# Patient Record
Sex: MALE | Race: WHITE | NOT HISPANIC OR LATINO | Employment: OTHER | URBAN - METROPOLITAN AREA
[De-identification: names, ages, dates, MRNs, and addresses within clinical notes are randomized per-mention and may not be internally consistent; named-entity substitution may affect disease eponyms.]

---

## 2023-08-21 ENCOUNTER — TELEPHONE (OUTPATIENT)
Age: 53
End: 2023-08-21

## 2023-08-21 ENCOUNTER — PREP FOR PROCEDURE (OUTPATIENT)
Age: 53
End: 2023-08-21

## 2023-08-21 DIAGNOSIS — Z12.11 SCREENING FOR COLON CANCER: Primary | ICD-10-CM

## 2023-08-21 NOTE — TELEPHONE ENCOUNTER
08/21/23  Screened by: Pau Mcfarland    Referring Provider     Pre- Screening: There is no height or weight on file to calculate BMI. 6'1 224 lbs  BMI 29.6  Has patient been referred for a routine screening Colonoscopy? yes  Is the patient between 43-73 years old? yes      Previous Colonoscopy no   If yes:    Date:     Facility:     Reason:       SCHEDULING STAFF: If the patient is between 45yrs-49yrs, please advise patient to confirm benefits/coverage with their insurance company for a routine screening colonoscopy, some insurance carriers will only cover at [address] [place] [address] or older. If the patient is over 66years old, please schedule an office visit. Does the patient want to see a Gastroenterologist prior to their procedure OR are they having any GI symptoms? no    Has the patient been hospitalized or had abdominal surgery in the past 6 months? no    Does the patient use supplemental oxygen? no    Does the patient take Coumadin, Lovenox, Plavix, Elliquis, Xarelto, or other blood thinning medication? no    Has the patient had a stroke, cardiac event, or stent placed in the past year? no     Patient passed OA    SCHEDULING STAFF: If patient answers NO to above questions, then schedule procedure. If patient answers YES to above questions, then schedule office appointment. If patient is between 45yrs - 49yrs, please advise patient that we will have to confirm benefits & coverage with their insurance company for a routine screening colonoscopy.

## 2023-08-21 NOTE — TELEPHONE ENCOUNTER
Scheduled date of colonoscopy (as of today):  9/14/23  Physician performing colonoscopy:   Location of colonoscopy: [facility]   Bowel prep reviewed with patient: Miralax and Dulcolax prep instructions reviewed, emailed and mailed per patients request.  Instructions reviewed with patient by:  Clearances:       Patient has  prime remote unable to find in system. Benefit ID number is [id number] or can use [id number].   Patient had referral from [address] [place] [address] number is [phone]

## 2023-08-28 ENCOUNTER — NURSE TRIAGE (OUTPATIENT)
Age: 53
End: 2023-08-28

## 2023-08-28 ENCOUNTER — TELEPHONE (OUTPATIENT)
Age: 53
End: 2023-08-28

## 2023-08-28 NOTE — TELEPHONE ENCOUNTER
CALL FROM PT INQUIRING IF HE CAN TAKE MEDICATIONS PRIOR TO PROCEDURE, NOT TAKING ANY BLOOD THINNERS, PT ADVISED HE CAN TAKE HIS USUAL PRESCRIBED MEDICATIONS WITH A SIP OF WATER PRIOR TO PROCEDURE.

## 2023-08-28 NOTE — TELEPHONE ENCOUNTER
Sagrario Devoid an email to send Miralax/Ducolax prep instructions to pt at his email on his chart today.

## 2023-09-13 ENCOUNTER — ANESTHESIA EVENT (OUTPATIENT)
Dept: ANESTHESIOLOGY | Facility: HOSPITAL | Age: 53
End: 2023-09-13

## 2023-09-13 ENCOUNTER — ANESTHESIA (OUTPATIENT)
Dept: ANESTHESIOLOGY | Facility: HOSPITAL | Age: 53
End: 2023-09-13

## 2023-09-14 NOTE — ANESTHESIA PREPROCEDURE EVALUATION
Procedure:  PRE-OP ONLY  Colonoscopy CRC Screening     No patient information available in chart       Anesthesia Plan  ASA Score-     Anesthesia Type- IV sedation with anesthesia with ASA Monitors.          Additional Monitors:   Airway Plan:           Plan Factors-    Induction-     Postoperative Plan-     Informed Consent-

## 2023-10-10 ENCOUNTER — HOSPITAL ENCOUNTER (INPATIENT)
Facility: HOSPITAL | Age: 53
LOS: 2 days | Discharge: HOME | DRG: 392 | End: 2023-10-14
Attending: STUDENT IN AN ORGANIZED HEALTH CARE EDUCATION/TRAINING PROGRAM | Admitting: SPECIALIST
Payer: OTHER GOVERNMENT

## 2023-10-10 ENCOUNTER — APPOINTMENT (EMERGENCY)
Dept: ULTRASOUND IMAGING | Facility: HOSPITAL | Age: 53
DRG: 392 | End: 2023-10-10
Payer: OTHER GOVERNMENT

## 2023-10-10 ENCOUNTER — APPOINTMENT (EMERGENCY)
Dept: CT IMAGING | Facility: HOSPITAL | Age: 53
DRG: 392 | End: 2023-10-10
Payer: OTHER GOVERNMENT

## 2023-10-10 DIAGNOSIS — R10.11 RUQ ABDOMINAL PAIN: Primary | ICD-10-CM

## 2023-10-10 DIAGNOSIS — F17.210 TOBACCO SMOKER, 1 PACK OF CIGARETTES OR LESS PER DAY: ICD-10-CM

## 2023-10-10 DIAGNOSIS — K80.50 BILIARY COLIC: ICD-10-CM

## 2023-10-10 DIAGNOSIS — K29.00 ACUTE SUPERFICIAL GASTRITIS WITHOUT HEMORRHAGE: ICD-10-CM

## 2023-10-10 LAB
ALBUMIN SERPL BCP-MCNC: 4.6 G/DL (ref 3.5–5)
ALP SERPL-CCNC: 57 U/L (ref 34–104)
ALT SERPL W P-5'-P-CCNC: 39 U/L (ref 7–52)
ANION GAP SERPL CALCULATED.3IONS-SCNC: 8 MMOL/L
AST SERPL W P-5'-P-CCNC: 18 U/L (ref 13–39)
BASOPHILS # BLD AUTO: 0.07 THOUSANDS/ÂΜL (ref 0–0.1)
BASOPHILS NFR BLD AUTO: 1 % (ref 0–1)
BILIRUB DIRECT SERPL-MCNC: 0.24 MG/DL (ref 0–0.2)
BILIRUB SERPL-MCNC: 1.08 MG/DL (ref 0.2–1)
BUN SERPL-MCNC: 12 MG/DL (ref 5–25)
CALCIUM SERPL-MCNC: 9.4 MG/DL (ref 8.4–10.2)
CHLORIDE SERPL-SCNC: 107 MMOL/L (ref 96–108)
CO2 SERPL-SCNC: 22 MMOL/L (ref 21–32)
CREAT SERPL-MCNC: 0.79 MG/DL (ref 0.6–1.3)
EOSINOPHIL # BLD AUTO: 0.13 THOUSAND/ÂΜL (ref 0–0.61)
EOSINOPHIL NFR BLD AUTO: 1 % (ref 0–6)
ERYTHROCYTE [DISTWIDTH] IN BLOOD BY AUTOMATED COUNT: 13.1 % (ref 11.6–15.1)
GFR SERPL CREATININE-BSD FRML MDRD: 103 ML/MIN/1.73SQ M
GLUCOSE SERPL-MCNC: 153 MG/DL (ref 65–140)
HCT VFR BLD AUTO: 49.6 % (ref 36.5–49.3)
HGB BLD-MCNC: 17.4 G/DL (ref 12–17)
IMM GRANULOCYTES # BLD AUTO: 0.06 THOUSAND/UL (ref 0–0.2)
IMM GRANULOCYTES NFR BLD AUTO: 1 % (ref 0–2)
INR PPP: 1.1 (ref 0.84–1.19)
LACTATE SERPL-SCNC: 1.2 MMOL/L (ref 0.5–2)
LIPASE SERPL-CCNC: 11 U/L (ref 11–82)
LYMPHOCYTES # BLD AUTO: 2.68 THOUSANDS/ÂΜL (ref 0.6–4.47)
LYMPHOCYTES NFR BLD AUTO: 22 % (ref 14–44)
MCH RBC QN AUTO: 30.2 PG (ref 26.8–34.3)
MCHC RBC AUTO-ENTMCNC: 35.1 G/DL (ref 31.4–37.4)
MCV RBC AUTO: 86 FL (ref 82–98)
MONOCYTES # BLD AUTO: 0.92 THOUSAND/ÂΜL (ref 0.17–1.22)
MONOCYTES NFR BLD AUTO: 7 % (ref 4–12)
NEUTROPHILS # BLD AUTO: 8.54 THOUSANDS/ÂΜL (ref 1.85–7.62)
NEUTS SEG NFR BLD AUTO: 68 % (ref 43–75)
NRBC BLD AUTO-RTO: 0 /100 WBCS
PLATELET # BLD AUTO: 242 THOUSANDS/UL (ref 149–390)
PMV BLD AUTO: 10.2 FL (ref 8.9–12.7)
POTASSIUM SERPL-SCNC: 3.7 MMOL/L (ref 3.5–5.3)
PROT SERPL-MCNC: 7.3 G/DL (ref 6.4–8.4)
PROTHROMBIN TIME: 14.5 SECONDS (ref 11.6–14.5)
RBC # BLD AUTO: 5.76 MILLION/UL (ref 3.88–5.62)
SODIUM SERPL-SCNC: 137 MMOL/L (ref 135–147)
WBC # BLD AUTO: 12.4 THOUSAND/UL (ref 4.31–10.16)

## 2023-10-10 PROCEDURE — 80076 HEPATIC FUNCTION PANEL: CPT | Performed by: STUDENT IN AN ORGANIZED HEALTH CARE EDUCATION/TRAINING PROGRAM

## 2023-10-10 PROCEDURE — 74177 CT ABD & PELVIS W/CONTRAST: CPT

## 2023-10-10 PROCEDURE — 83605 ASSAY OF LACTIC ACID: CPT | Performed by: STUDENT IN AN ORGANIZED HEALTH CARE EDUCATION/TRAINING PROGRAM

## 2023-10-10 PROCEDURE — 96366 THER/PROPH/DIAG IV INF ADDON: CPT

## 2023-10-10 PROCEDURE — 80048 BASIC METABOLIC PNL TOTAL CA: CPT | Performed by: STUDENT IN AN ORGANIZED HEALTH CARE EDUCATION/TRAINING PROGRAM

## 2023-10-10 PROCEDURE — 99285 EMERGENCY DEPT VISIT HI MDM: CPT | Performed by: STUDENT IN AN ORGANIZED HEALTH CARE EDUCATION/TRAINING PROGRAM

## 2023-10-10 PROCEDURE — 96375 TX/PRO/DX INJ NEW DRUG ADDON: CPT

## 2023-10-10 PROCEDURE — 99284 EMERGENCY DEPT VISIT MOD MDM: CPT

## 2023-10-10 PROCEDURE — 96365 THER/PROPH/DIAG IV INF INIT: CPT

## 2023-10-10 PROCEDURE — 76705 ECHO EXAM OF ABDOMEN: CPT

## 2023-10-10 PROCEDURE — 85610 PROTHROMBIN TIME: CPT | Performed by: STUDENT IN AN ORGANIZED HEALTH CARE EDUCATION/TRAINING PROGRAM

## 2023-10-10 PROCEDURE — 36415 COLL VENOUS BLD VENIPUNCTURE: CPT | Performed by: STUDENT IN AN ORGANIZED HEALTH CARE EDUCATION/TRAINING PROGRAM

## 2023-10-10 PROCEDURE — G1004 CDSM NDSC: HCPCS

## 2023-10-10 PROCEDURE — 85025 COMPLETE CBC W/AUTO DIFF WBC: CPT | Performed by: STUDENT IN AN ORGANIZED HEALTH CARE EDUCATION/TRAINING PROGRAM

## 2023-10-10 PROCEDURE — 83690 ASSAY OF LIPASE: CPT | Performed by: STUDENT IN AN ORGANIZED HEALTH CARE EDUCATION/TRAINING PROGRAM

## 2023-10-10 RX ORDER — HYDROMORPHONE HCL/PF 1 MG/ML
1 SYRINGE (ML) INJECTION ONCE
Status: DISCONTINUED | OUTPATIENT
Start: 2023-10-10 | End: 2023-10-10

## 2023-10-10 RX ORDER — SODIUM CHLORIDE, SODIUM LACTATE, POTASSIUM CHLORIDE, CALCIUM CHLORIDE 600; 310; 30; 20 MG/100ML; MG/100ML; MG/100ML; MG/100ML
75 INJECTION, SOLUTION INTRAVENOUS CONTINUOUS
Status: DISCONTINUED | OUTPATIENT
Start: 2023-10-10 | End: 2023-10-13

## 2023-10-10 RX ORDER — KETOROLAC TROMETHAMINE 30 MG/ML
15 INJECTION, SOLUTION INTRAMUSCULAR; INTRAVENOUS EVERY 6 HOURS PRN
Status: DISPENSED | OUTPATIENT
Start: 2023-10-10 | End: 2023-10-11

## 2023-10-10 RX ORDER — ONDANSETRON 2 MG/ML
4 INJECTION INTRAMUSCULAR; INTRAVENOUS EVERY 6 HOURS PRN
Status: DISCONTINUED | OUTPATIENT
Start: 2023-10-10 | End: 2023-10-14 | Stop reason: HOSPADM

## 2023-10-10 RX ORDER — SODIUM CHLORIDE 9 MG/ML
100 INJECTION, SOLUTION INTRAVENOUS ONCE
Status: CANCELLED | OUTPATIENT
Start: 2023-10-10 | End: 2023-10-10

## 2023-10-10 RX ORDER — ACETAMINOPHEN 160 MG/5ML
650 SUSPENSION ORAL EVERY 4 HOURS PRN
Status: DISCONTINUED | OUTPATIENT
Start: 2023-10-10 | End: 2023-10-14 | Stop reason: HOSPADM

## 2023-10-10 RX ORDER — KETOROLAC TROMETHAMINE 30 MG/ML
15 INJECTION, SOLUTION INTRAMUSCULAR; INTRAVENOUS ONCE
Status: COMPLETED | OUTPATIENT
Start: 2023-10-10 | End: 2023-10-10

## 2023-10-10 RX ORDER — DIPHENHYDRAMINE HYDROCHLORIDE 50 MG/ML
25 INJECTION INTRAMUSCULAR; INTRAVENOUS EVERY 8 HOURS SCHEDULED
Status: CANCELLED | OUTPATIENT
Start: 2023-10-10 | End: 2023-10-13

## 2023-10-10 RX ORDER — HYDROCHLOROTHIAZIDE 25 MG/1
50 TABLET ORAL DAILY
COMMUNITY

## 2023-10-10 RX ORDER — NICOTINE 21 MG/24HR
1 PATCH, TRANSDERMAL 24 HOURS TRANSDERMAL DAILY
Status: DISCONTINUED | OUTPATIENT
Start: 2023-10-10 | End: 2023-10-14 | Stop reason: HOSPADM

## 2023-10-10 RX ORDER — METOCLOPRAMIDE HYDROCHLORIDE 5 MG/ML
10 INJECTION INTRAMUSCULAR; INTRAVENOUS EVERY 8 HOURS SCHEDULED
Status: CANCELLED | OUTPATIENT
Start: 2023-10-10 | End: 2023-10-13

## 2023-10-10 RX ORDER — MAGNESIUM SULFATE HEPTAHYDRATE 40 MG/ML
2 INJECTION, SOLUTION INTRAVENOUS
Status: CANCELLED | OUTPATIENT
Start: 2023-10-10 | End: 2023-10-13

## 2023-10-10 RX ORDER — KETOROLAC TROMETHAMINE 30 MG/ML
30 INJECTION, SOLUTION INTRAMUSCULAR; INTRAVENOUS EVERY 8 HOURS SCHEDULED
Status: CANCELLED | OUTPATIENT
Start: 2023-10-10 | End: 2023-10-13

## 2023-10-10 RX ORDER — MORPHINE SULFATE 4 MG/ML
4 INJECTION, SOLUTION INTRAMUSCULAR; INTRAVENOUS EVERY 4 HOURS PRN
Status: DISCONTINUED | OUTPATIENT
Start: 2023-10-10 | End: 2023-10-14 | Stop reason: HOSPADM

## 2023-10-10 RX ORDER — SODIUM CHLORIDE, SODIUM GLUCONATE, SODIUM ACETATE, POTASSIUM CHLORIDE, MAGNESIUM CHLORIDE, SODIUM PHOSPHATE, DIBASIC, AND POTASSIUM PHOSPHATE .53; .5; .37; .037; .03; .012; .00082 G/100ML; G/100ML; G/100ML; G/100ML; G/100ML; G/100ML; G/100ML
1000 INJECTION, SOLUTION INTRAVENOUS ONCE
Status: COMPLETED | OUTPATIENT
Start: 2023-10-10 | End: 2023-10-10

## 2023-10-10 RX ORDER — HYDROCHLOROTHIAZIDE 25 MG/1
50 TABLET ORAL DAILY
Status: DISCONTINUED | OUTPATIENT
Start: 2023-10-10 | End: 2023-10-14 | Stop reason: HOSPADM

## 2023-10-10 RX ORDER — BUTALBITAL, ACETAMINOPHEN AND CAFFEINE 50; 325; 40 MG/1; MG/1; MG/1
1 TABLET ORAL EVERY 4 HOURS PRN
Status: CANCELLED | OUTPATIENT
Start: 2023-10-10

## 2023-10-10 RX ORDER — SUMATRIPTAN 6 MG/.5ML
6 INJECTION, SOLUTION SUBCUTANEOUS
Status: CANCELLED | OUTPATIENT
Start: 2023-10-10

## 2023-10-10 RX ADMIN — IOHEXOL 100 ML: 350 INJECTION, SOLUTION INTRAVENOUS at 07:23

## 2023-10-10 RX ADMIN — ACETAMINOPHEN 650 MG: 650 SUSPENSION ORAL at 15:11

## 2023-10-10 RX ADMIN — KETOROLAC TROMETHAMINE 15 MG: 30 INJECTION, SOLUTION INTRAMUSCULAR; INTRAVENOUS at 19:32

## 2023-10-10 RX ADMIN — KETOROLAC TROMETHAMINE 15 MG: 30 INJECTION, SOLUTION INTRAMUSCULAR; INTRAVENOUS at 06:41

## 2023-10-10 RX ADMIN — KETOROLAC TROMETHAMINE 15 MG: 30 INJECTION, SOLUTION INTRAMUSCULAR; INTRAVENOUS at 13:38

## 2023-10-10 RX ADMIN — SODIUM CHLORIDE, SODIUM GLUCONATE, SODIUM ACETATE, POTASSIUM CHLORIDE, MAGNESIUM CHLORIDE, SODIUM PHOSPHATE, DIBASIC, AND POTASSIUM PHOSPHATE 1000 ML: .53; .5; .37; .037; .03; .012; .00082 INJECTION, SOLUTION INTRAVENOUS at 06:34

## 2023-10-10 RX ADMIN — SODIUM CHLORIDE, SODIUM LACTATE, POTASSIUM CHLORIDE, AND CALCIUM CHLORIDE 125 ML/HR: .6; .31; .03; .02 INJECTION, SOLUTION INTRAVENOUS at 11:39

## 2023-10-10 RX ADMIN — SODIUM CHLORIDE, SODIUM LACTATE, POTASSIUM CHLORIDE, AND CALCIUM CHLORIDE 125 ML/HR: .6; .31; .03; .02 INJECTION, SOLUTION INTRAVENOUS at 19:32

## 2023-10-10 RX ADMIN — NICOTINE 1 PATCH: 21 PATCH, EXTENDED RELEASE TRANSDERMAL at 11:38

## 2023-10-10 NOTE — LETTER
10/13/23    Re:  Ashley Stanton,  24    To whom it may concern,    Mr Mary Shrestha is being treated for musculoskeletal pain, and for findings of gastritis on endoscopy. He has been instructed to use warm compresses for his right upper quadrant abdominal wall, to use Celebrex for pain, to avoid other NSAIDs such as ibuprofen, to avoid any heavy lifting or strenuous activity for the next 2 weeks. He has been placed at bedrest for the next 72 hours, and can return to work on 2023 with the above restrictions on strenuous activity or heavy lifting. He has been prescribed PPI medications as well as Carafate, and has follow-up planned with gastroenterology in 2 weeks. Sincerely,        Roxie Simons.  Maddie Medina MD, FACS

## 2023-10-10 NOTE — ED PROVIDER NOTES
History  Chief Complaint   Patient presents with   • Abdominal Pain     Pt feeling ill with flu like sx since Saturday. Pt having abd pain since yesterday. +nausea       History provided by:  Patient and medical records     47year old M. Presents with worsening RUQ abdominal pain. Gnawing in nature. Exacerbated with any movement and PO intake. Worsening since this past Saturday. Having associated nausea without vomiting/diarrhea. Over the last 2 days, he feels as if he has become more constipated. Continues to pass flatus. Having decreased oral intake due to malaise. The patient expresses that he felt as if he had flu-like symptoms due to having subjective fevers/chills, body aches. The patient localized the pain to the RUQ with radiation of the pain into the epigastrium and RLQ. Has been taking Motrin/Tylenol without relief. Took a laxative this AM without relief. History reviewed. No pertinent past medical history. History reviewed. No pertinent surgical history. History reviewed. No pertinent family history. I have reviewed and agree with the history as documented. E-Cigarette/Vaping   • E-Cigarette Use Never User      E-Cigarette/Vaping Substances     Social History     Tobacco Use   • Smoking status: Every Day     Packs/day: 1.50     Types: Cigarettes   • Smokeless tobacco: Never   Vaping Use   • Vaping Use: Never used   Substance Use Topics   • Alcohol use: Not Currently   • Drug use: Never       Review of Systems   Constitutional: Positive for activity change, appetite change, chills, fatigue and fever. HENT: Negative for congestion, rhinorrhea, sinus pressure, sinus pain and sore throat. Respiratory: Negative for cough, chest tightness, shortness of breath and wheezing. Cardiovascular: Negative for chest pain and palpitations. Gastrointestinal: Positive for abdominal pain, constipation and nausea. Negative for abdominal distention, diarrhea and vomiting.    Genitourinary: Negative for decreased urine volume, difficulty urinating, dysuria, flank pain, frequency and urgency. Musculoskeletal: Positive for myalgias. Negative for arthralgias, neck pain and neck stiffness. Skin: Negative for color change, pallor and wound. Neurological: Negative for dizziness, syncope, weakness, light-headedness and headaches. Hematological: Does not bruise/bleed easily. Psychiatric/Behavioral: Positive for sleep disturbance. All other systems reviewed and are negative. Physical Exam  Physical Exam  Vitals and nursing note reviewed. Constitutional:       General: He is in acute distress. Appearance: He is not ill-appearing or toxic-appearing. HENT:      Head: Normocephalic and atraumatic. Cardiovascular:      Rate and Rhythm: Normal rate and regular rhythm. Heart sounds: Normal heart sounds. No murmur heard. Pulmonary:      Effort: Pulmonary effort is normal. No respiratory distress. Breath sounds: Normal breath sounds. No stridor. No wheezing, rhonchi or rales. Chest:      Chest wall: No tenderness. Abdominal:      General: Abdomen is flat. Bowel sounds are normal. There is no distension. Palpations: Abdomen is soft. Tenderness: There is abdominal tenderness in the right upper quadrant, right lower quadrant and epigastric area. There is guarding. There is no rebound. Hernia: No hernia is present. Skin:     General: Skin is warm and dry. Capillary Refill: Capillary refill takes less than 2 seconds. Coloration: Skin is not cyanotic, jaundiced, mottled or pale. Findings: No erythema or rash. Neurological:      General: No focal deficit present. Mental Status: He is alert and oriented to person, place, and time. Cranial Nerves: No cranial nerve deficit. Motor: No weakness.    Psychiatric:         Mood and Affect: Mood normal.         Behavior: Behavior normal.         Vital Signs  ED Triage Vitals [10/10/23 0615]   Temperature Pulse Respirations Blood Pressure SpO2   97.8 °F (36.6 °C) 97 19 (!) 194/101 97 %      Temp Source Heart Rate Source Patient Position - Orthostatic VS BP Location FiO2 (%)   Temporal Monitor Lying Left arm --      Pain Score       --         Vitals:    10/10/23 0615   BP: (!) 194/101   Pulse: 97   Patient Position - Orthostatic VS: Lying     ED Medications  Medications   ondansetron (ZOFRAN) injection 4 mg (has no administration in time range)   morphine injection 4 mg (has no administration in time range)   multi-electrolyte (ISOLYTE-S PH 7.4) bolus 1,000 mL (0 mL Intravenous Stopped 10/10/23 1039)   ketorolac (TORADOL) injection 15 mg (15 mg Intravenous Given 10/10/23 0641)   iohexol (OMNIPAQUE) 350 MG/ML injection (MULTI-DOSE) 100 mL (100 mL Intravenous Given 10/10/23 0723)     Diagnostic Studies  Results Reviewed     Procedure Component Value Units Date/Time    Basic metabolic panel [[id number]]  (Abnormal) Collected: 10/10/23 0633    Lab Status: Final result Specimen: Blood from Arm, Right Updated: 10/10/23 0659     Sodium 137 mmol/L      Potassium 3.7 mmol/L      Chloride 107 mmol/L      CO2 22 mmol/L      ANION GAP 8 mmol/L      BUN 12 mg/dL      Creatinine 0.79 mg/dL      Glucose 153 mg/dL      Calcium 9.4 mg/dL      eGFR 103 ml/min/1.73sq m     Narrative:      [place] guidelines for Chronic Kidney Disease (CKD):   •  Stage 1 with normal or high GFR (GFR > 90 mL/min/1.73 square meters)  •  Stage 2 Mild CKD (GFR = 60-89 mL/min/1.73 square meters)  •  Stage 3A Moderate CKD (GFR = 45-59 mL/min/1.73 square meters)  •  Stage 3B Moderate CKD (GFR = 30-44 mL/min/1.73 square meters)  •  Stage 4 Severe CKD (GFR = 15-29 mL/min/1.73 square meters)  •  Stage 5 End Stage CKD (GFR <15 mL/min/1.73 square meters)  Note: GFR calculation is accurate only with a steady state creatinine    Hepatic function panel [[id number]]  (Abnormal) Collected: 10/10/23 5375    Lab Status: Final result Specimen: Blood from Arm, Right Updated: 10/10/23 0659     Total Bilirubin 1.08 mg/dL      Bilirubin, Direct 0.24 mg/dL      Alkaline Phosphatase 57 U/L      AST 18 U/L      ALT 39 U/L      Total Protein 7.3 g/dL      Albumin 4.6 g/dL     Lipase [[id number]]  (Normal) Collected: 10/10/23 5454    Lab Status: Final result Specimen: Blood from Arm, Right Updated: 10/10/23 0659     Lipase 11 u/L     Lactic acid, plasma (w/reflex if result > 2.0) [[id number]]  (Normal) Collected: 10/10/23 0095    Lab Status: Final result Specimen: Blood from Arm, Right Updated: 10/10/23 5793     LACTIC ACID 1.2 mmol/L     Narrative:      Result may be elevated if tourniquet was used during collection. Protime-INR [[id number]]  (Normal) Collected: 10/10/23 0633    Lab Status: Final result Specimen: Blood from Arm, Right Updated: 10/10/23 0654     Protime 14.5 seconds      INR 1.10    CBC and differential [[id number]]  (Abnormal) Collected: 10/10/23 1779    Lab Status: Final result Specimen: Blood from Arm, Right Updated: 10/10/23 0642     WBC 12.40 Thousand/uL      RBC 5.76 Million/uL      Hemoglobin 17.4 g/dL      Hematocrit 49.6 %      MCV 86 fL      MCH 30.2 pg      MCHC 35.1 g/dL      RDW 13.1 %      MPV 10.2 fL      Platelets 438 Thousands/uL      nRBC 0 /100 WBCs      Neutrophils Relative 68 %      Immat GRANS % 1 %      Lymphocytes Relative 22 %      Monocytes Relative 7 %      Eosinophils Relative 1 %      Basophils Relative 1 %      Neutrophils Absolute 8.54 Thousands/µL      Immature Grans Absolute 0.06 Thousand/uL      Lymphocytes Absolute 2.68 Thousands/µL      Monocytes Absolute 0.92 Thousand/µL      Eosinophils Absolute 0.13 Thousand/µL      Basophils Absolute 0.07 Thousands/µL              US right upper quadrant   Final Result by Ghada Ramon MD (10/10 1020)      Nondistended gallbladder containing a small amount of sludge with minimal pericholecystic fluid, but no wall thickening.  Tenderness throughout the region was elicited, but not specific to the gallbladder. Findings are equivocal for acute cholecystitis. If there is continued clinical concern, HIDA scan can be obtained. Hepatic steatosis. The study was marked in [facility] for immediate notification. Workstation performed: [id number]         CT abdomen pelvis with contrast   Final Result by Stu Rodriguez MD (10/10 0595)      1. Findings suggesting mild enterocolitis in the appropriate clinical setting. 2. Mild infiltration of the peritoneal fat in the right upper quadrant adjacent to the inferior aspect of the liver/gallbladder fundus without calcified cholelithiasis or evidence of acute cholecystitis, possibly reactive to the bowel process, although    slightly remote from it. 3. Hepatic steatosis. Workstation performed: [id number]         NM inpatient order    (Results Pending)          Procedures  Procedures     ED Course  ED Course as of 10/10/23 1117   Tue Oct 10, 2023   5490 Patient declining IV opioid. Will administer a dose of IV Toradol.    0651 Leukocytosis. Mildly elevated hemoglobin/hematocrit likely secondary to decreased oral intake.   0706 Mild hyperbilirubinemia. No significant abnormalities noted on hepatic function panel. Lipase is within normal limits. No significant abnormalities on BMP.   0729 Upon reevaluation, the patient's pain is improved. Continues to have mild lower abdominal cramping. Develops severe right upper quadrant abdominal pain while speaking. 9856 CT abdomen pelvis with contrast   0756 Given the CT read, will obtain right upper quadrant ultrasound. 1022 Ultrasound findings equivocal for acute cholecystitis. Reaching out to general surgery for recommendations (Dr. Cruz Daily)     SBIRT 20yo+    Flowsheet Row Most Recent Value   Initial Alcohol Screen: US AUDIT-C     1. How often do you have a drink containing alcohol? 0 Filed at: 10/10/2023 0619   2.  How many drinks containing alcohol do you have on a typical day you are drinking? 0 Filed at: 10/10/2023 0619   3a. Male UNDER 65: How often do you have five or more drinks on one occasion? 0 Filed at: 10/10/2023 0619   3b. FEMALE Any Age, or MALE 65+: How often do you have 4 or more drinks on one occassion? 0 Filed at: 10/10/2023 4125   Audit-C Score 0 Filed at: 10/10/2023 5460   [place]: How many times in the past year have you. .. Used an illegal drug or used a prescription medication for non-medical reasons? Never Filed at: 10/10/2023 7776        Medical Decision Making  The differential diagnoses include but are not limited to cholecystitis, choledocholithiasis, pancreatitis, cholangitis,  Vital signs reviewed. The laboratory and imaging w/u is equivocal for acute cholecystitis. Pain improved with IV Toradol. Admitted to General Surgery for further management. RUQ abdominal pain: acute illness or injury  Amount and/or Complexity of Data Reviewed  Labs: ordered. Decision-making details documented in ED Course. Radiology: ordered. Decision-making details documented in ED Course. Risk  Prescription drug management. Decision regarding hospitalization. Disposition  Final diagnoses:   RUQ abdominal pain     Time reflects when diagnosis was documented in both MDM as applicable and the Disposition within this note     Time User Action Codes Description Comment    10/10/2023 10:36 AM Rosangela Allison Add [R10.11] RUQ abdominal pain       ED Disposition     ED Disposition   Admit    Condition   Stable    Date/Time   Tue Oct 10, 2023 10:37 AM    Comment   Case was discussed with Dr. Brenda Guidry and the patient's admission status was agreed to be Admission Status: observation status to the service of Dr. Brenda Guidry . Follow-up Information    None         Patient's Medications    No medications on file       No discharge procedures on file.     PDMP Review     None          ED Provider  Electronically Signed by           Ruthy Gonzalez DO  10/10/23 1121

## 2023-10-10 NOTE — H&P
H&P Exam - General Surgery   Bjorn Wright 47 y.o. male MRN: [id number]  Unit/Bed#: -[facility] Encounter: [id number]    Assessment/Plan     Assessment: 15-year-old male with RUQ abdominal pain, rule out acute cholecystitis    US RUQ 10/10/2023: Findings are equivocal for acute cholecystitis. BILIARY:  The gallbladder is normal in caliber. No wall thickening. Minimal pericholecystic fluid. There is layering sludge without evidence for shadowing calculi. Tenderness throughout the region was elicited but not specific to the gallbladder. No intrahepatic biliary dilatation. CBD measures 4.0 mm. No choledocholithiasis. CTAP 10/10/2023:   IMPRESSION:  1. Findings suggesting mild enterocolitis in the appropriate clinical setting. 2. Mild infiltration of the peritoneal fat in the right upper quadrant adjacent to the inferior aspect of the liver/gallbladder fundus without calcified cholelithiasis or evidence of acute cholecystitis, possibly reactive to the bowel process, although   slightly remote from it. 3. Hepatic steatosis.     Afebrile, hypertensive 176/101 otherwise vital signs WNL  WBC 12.4  Hemoglobin 17.4  Lipase, lactic acid normal  Alk phos, transaminases normal  T. bili 1.08    PMHx: HTN, ptsd, HAs  PSHx: Umbilical hernia repair x 2, LIH repair, Left rotator cuff repair     Plan:   CLD  NPO after midnight for HIDA scan tomorrow   IV fluids  Analgesics/antiemetics  Serial abdominal exams  Trend labs    History of Present Illness     HPI:  Ashley Eid is a 47 y.o. male with PMHx of HTN who presents with severe worsening RUQ abdominal pain x 2 days. He has had associated symptoms of nausea, fever, chills, body aches and mild constipation. His pain is a constant ache. It is aggrevated by lying on his side or palpating in the RUQ of his abdomen. He denies diarrhea, hematochezia, bloating, vomiting, cp or sob.  While in the ED, US was equivical for cholecystitis with minimal pericholecystic fluid and no stones or wall thickening. CT showed signs of possible mild enterocolitis. Lab studies showed mild leukocytosis, otherwise normal.     Review of Systems   Constitutional: Negative for chills and fever. HENT: Negative for congestion, ear pain, rhinorrhea and sore throat. Eyes: Negative for pain and visual disturbance. Respiratory: Negative for cough and shortness of breath. Cardiovascular: Negative for chest pain and palpitations. Gastrointestinal: Positive for abdominal pain. Negative for abdominal distention, constipation, diarrhea, nausea and vomiting. Genitourinary: Negative for difficulty urinating, dysuria and hematuria. Musculoskeletal: Negative for arthralgias and back pain. Skin: Negative for color change and rash. Neurological: Positive for headaches. Negative for dizziness, seizures, syncope, facial asymmetry and weakness. Psychiatric/Behavioral: Negative for agitation, behavioral problems and confusion. All other systems reviewed and are negative. Historical Information   History reviewed. No pertinent past medical history. History reviewed. No pertinent surgical history. Social History   Social History     Substance and Sexual Activity   Alcohol Use Not Currently     Social History     Substance and Sexual Activity   Drug Use Never     Social History     Tobacco Use   Smoking Status Every Day   • Packs/day: 1.50   • Types: Cigarettes   Smokeless Tobacco Never     E-Cigarette/Vaping   • E-Cigarette Use Never User      E-Cigarette/Vaping Substances     Family History: non-contributory    Meds/Allergies   PTA meds:   Prior to Admission Medications   Prescriptions Last Dose Informant Patient Reported?  Taking?   hydrochlorothiazide (HYDRODIURIL) 25 mg tablet 10/10/2023  Yes Yes   Sig: Take 50 mg by mouth daily      Facility-Administered Medications: None     No Known Allergies    Objective   First Vitals:   Blood Pressure: (!) 194/101 (10/10/23 0615)  Pulse: 97 (10/10/23 0615)  Temperature: 97.8 °F (36.6 °C) (10/10/23 0615)  Temp Source: Temporal (10/10/23 0615)  Respirations: 19 (10/10/23 0615)  Height: 6' 1" (185.4 cm) (10/10/23 0615)  Weight - Scale: 106 kg (234 lb 5.6 oz) (10/10/23 0615)  SpO2: 97 % (10/10/23 0615)    Current Vitals:   Blood Pressure: (!) 176/101 (10/10/23 1102)  Pulse: 91 (10/10/23 1102)  Temperature: 98.1 °F (36.7 °C) (10/10/23 1102)  Temp Source: Temporal (10/10/23 0615)  Respirations: 16 (10/10/23 1102)  Height: 6' 1" (185.4 cm) (10/10/23 0615)  Weight - Scale: 106 kg (234 lb 5.6 oz) (10/10/23 0615)  SpO2: 96 % (10/10/23 1102)    No intake or output data in the 24 hours ending 10/10/23 1258    Invasive Devices     Peripheral Intravenous Line  Duration           Peripheral IV 10/10/23 Left Antecubital <1 day                Physical Exam  Vitals and nursing note reviewed. Constitutional:       General: He is not in acute distress. Appearance: He is well-developed. HENT:      Head: Normocephalic and atraumatic. Nose: Nose normal.      Mouth/Throat:      Mouth: Mucous membranes are moist.   Eyes:      Conjunctiva/sclera: Conjunctivae normal.   Cardiovascular:      Rate and Rhythm: Normal rate and regular rhythm. Heart sounds: Normal heart sounds. No murmur heard. Pulmonary:      Effort: Pulmonary effort is normal. No respiratory distress. Breath sounds: Normal breath sounds. No wheezing or rales. Abdominal:      General: Abdomen is flat. Bowel sounds are normal. There is no distension. Palpations: Abdomen is soft. There is no mass. Tenderness: There is abdominal tenderness. There is no guarding or rebound. Hernia: No hernia is present. Comments: Tenderness to deep palpation in the RUQ, otherwise abd NT   Musculoskeletal:         General: No swelling. Cervical back: Neck supple. Skin:     General: Skin is warm and dry. Capillary Refill: Capillary refill takes less than 2 seconds.    Neurological: Mental Status: He is alert. Psychiatric:         Mood and Affect: Mood normal.         Lab Results:   CBC:   Lab Results   Component Value Date    WBC 12.40 (H) 10/10/2023    HGB 17.4 (H) 10/10/2023    HCT 49.6 (H) 10/10/2023    MCV 86 10/10/2023     10/10/2023    RBC 5.76 (H) 10/10/2023    MCH 30.2 10/10/2023    MCHC 35.1 10/10/2023    RDW 13.1 10/10/2023    MPV 10.2 10/10/2023    NRBC 0 10/10/2023   , CMP:   Lab Results   Component Value Date    SODIUM 137 10/10/2023    K 3.7 10/10/2023     10/10/2023    CO2 22 10/10/2023    BUN 12 10/10/2023    CREATININE 0.79 10/10/2023    CALCIUM 9.4 10/10/2023    AST 18 10/10/2023    ALT 39 10/10/2023    ALKPHOS 57 10/10/2023    EGFR 103 10/10/2023   , Coagulation:   Lab Results   Component Value Date    INR 1.10 10/10/2023   , Urinalysis: No results found for: "COLORU", "CLARITYU", "Marguerite Large", "PHUR", "LEUKOCYTESUR", "NITRITE", "PROTEINUA", "GLUCOSEU", "Rushie Abler", "Lennette Alamin", "BLOODU", Amylase: No results found for: "AMYLASE", Lipase:   Lab Results   Component Value Date    LIPASE 11 10/10/2023     Imaging: I have personally reviewed pertinent reports. EKG, Pathology, and Other Studies: I have personally reviewed pertinent reports. Code Status: Level 1 - Full Code  Advance Directive and Living Will:      Power of :    POLST:      Counseling / Coordination of Care  Total floor / unit time spent today 45 minutes. Greater than 50% of total time was spent with the patient and / or family counseling and / or coordination of care. A description of the counseling / coordination of care:  Review of patient's  Pmhx history, labs, imaging. Obtaining HPI, performing exam, discussing treatment plan with patient. Sharla Zavala

## 2023-10-11 ENCOUNTER — APPOINTMENT (OUTPATIENT)
Dept: NUCLEAR MEDICINE | Facility: HOSPITAL | Age: 53
DRG: 392 | End: 2023-10-11
Payer: OTHER GOVERNMENT

## 2023-10-11 LAB
ALBUMIN SERPL BCP-MCNC: 4 G/DL (ref 3.5–5)
ALP SERPL-CCNC: 48 U/L (ref 34–104)
ALT SERPL W P-5'-P-CCNC: 31 U/L (ref 7–52)
ANION GAP SERPL CALCULATED.3IONS-SCNC: 7 MMOL/L
AST SERPL W P-5'-P-CCNC: 16 U/L (ref 13–39)
BILIRUB SERPL-MCNC: 1.01 MG/DL (ref 0.2–1)
BUN SERPL-MCNC: 10 MG/DL (ref 5–25)
CALCIUM SERPL-MCNC: 9.1 MG/DL (ref 8.4–10.2)
CHLORIDE SERPL-SCNC: 107 MMOL/L (ref 96–108)
CO2 SERPL-SCNC: 25 MMOL/L (ref 21–32)
CREAT SERPL-MCNC: 0.76 MG/DL (ref 0.6–1.3)
ERYTHROCYTE [DISTWIDTH] IN BLOOD BY AUTOMATED COUNT: 12.9 % (ref 11.6–15.1)
GFR SERPL CREATININE-BSD FRML MDRD: 104 ML/MIN/1.73SQ M
GLUCOSE P FAST SERPL-MCNC: 101 MG/DL (ref 65–99)
GLUCOSE SERPL-MCNC: 101 MG/DL (ref 65–140)
HCT VFR BLD AUTO: 45.7 % (ref 36.5–49.3)
HGB BLD-MCNC: 15.9 G/DL (ref 12–17)
MCH RBC QN AUTO: 29.8 PG (ref 26.8–34.3)
MCHC RBC AUTO-ENTMCNC: 34.8 G/DL (ref 31.4–37.4)
MCV RBC AUTO: 86 FL (ref 82–98)
PLATELET # BLD AUTO: 237 THOUSANDS/UL (ref 149–390)
PMV BLD AUTO: 10.3 FL (ref 8.9–12.7)
POTASSIUM SERPL-SCNC: 3.9 MMOL/L (ref 3.5–5.3)
PROT SERPL-MCNC: 6.4 G/DL (ref 6.4–8.4)
RBC # BLD AUTO: 5.33 MILLION/UL (ref 3.88–5.62)
SODIUM SERPL-SCNC: 139 MMOL/L (ref 135–147)
WBC # BLD AUTO: 10.21 THOUSAND/UL (ref 4.31–10.16)

## 2023-10-11 PROCEDURE — 80053 COMPREHEN METABOLIC PANEL: CPT | Performed by: SPECIALIST

## 2023-10-11 PROCEDURE — 78226 HEPATOBILIARY SYSTEM IMAGING: CPT

## 2023-10-11 PROCEDURE — A9537 TC99M MEBROFENIN: HCPCS

## 2023-10-11 PROCEDURE — G1004 CDSM NDSC: HCPCS

## 2023-10-11 PROCEDURE — 85027 COMPLETE CBC AUTOMATED: CPT | Performed by: SPECIALIST

## 2023-10-11 PROCEDURE — C9113 INJ PANTOPRAZOLE SODIUM, VIA: HCPCS | Performed by: SPECIALIST

## 2023-10-11 RX ORDER — PANTOPRAZOLE SODIUM 40 MG/10ML
40 INJECTION, POWDER, LYOPHILIZED, FOR SOLUTION INTRAVENOUS
Status: DISCONTINUED | OUTPATIENT
Start: 2023-10-11 | End: 2023-10-13

## 2023-10-11 RX ADMIN — KETOROLAC TROMETHAMINE 15 MG: 30 INJECTION, SOLUTION INTRAMUSCULAR; INTRAVENOUS at 10:37

## 2023-10-11 RX ADMIN — HYDROCHLOROTHIAZIDE 50 MG: 25 TABLET ORAL at 08:14

## 2023-10-11 RX ADMIN — SODIUM CHLORIDE, SODIUM LACTATE, POTASSIUM CHLORIDE, AND CALCIUM CHLORIDE 125 ML/HR: .6; .31; .03; .02 INJECTION, SOLUTION INTRAVENOUS at 17:31

## 2023-10-11 RX ADMIN — NICOTINE 1 PATCH: 21 PATCH, EXTENDED RELEASE TRANSDERMAL at 08:14

## 2023-10-11 RX ADMIN — PANTOPRAZOLE SODIUM 40 MG: 40 INJECTION, POWDER, FOR SOLUTION INTRAVENOUS at 17:49

## 2023-10-11 RX ADMIN — SODIUM CHLORIDE, SODIUM LACTATE, POTASSIUM CHLORIDE, AND CALCIUM CHLORIDE 125 ML/HR: .6; .31; .03; .02 INJECTION, SOLUTION INTRAVENOUS at 03:38

## 2023-10-11 NOTE — PLAN OF CARE
Problem: GASTROINTESTINAL - ADULT  Goal: Minimal or absence of nausea and/or vomiting  Description: INTERVENTIONS:  - Administer IV fluids if ordered to ensure adequate hydration  - Maintain NPO status until nausea and vomiting are resolved  - Nasogastric tube if ordered  - Administer ordered antiemetic medications as needed  - Provide nonpharmacologic comfort measures as appropriate  - Advance diet as tolerated, if ordered  - Consider nutrition services referral to assist patient with adequate nutrition and appropriate food choices  Outcome: Progressing     Problem: PAIN - ADULT  Goal: Verbalizes/displays adequate comfort level or baseline comfort level  Description: Interventions:  - Encourage patient to monitor pain and request assistance  - Assess pain using appropriate pain scale  - Administer analgesics based on type and severity of pain and evaluate response  - Implement non-pharmacological measures as appropriate and evaluate response  - Consider cultural and social influences on pain and pain management  - Notify physician/advanced practitioner if interventions unsuccessful or patient reports new pain  Outcome: Progressing     Problem: SAFETY ADULT  Goal: Patient will remain free of falls  Description: INTERVENTIONS:  - Educate patient/family on patient safety including physical limitations  - Instruct patient to call for assistance with activity   - Consult OT/PT to assist with strengthening/mobility   - Keep Call bell within reach  - Keep bed low and locked with side rails adjusted as appropriate  - Keep care items and personal belongings within reach  - Initiate and maintain comfort rounds  - Make Fall Risk Sign visible to staff  - Obtain necessary fall risk management equipment  - Apply yellow socks and bracelet for high fall risk patients  - Consider moving patient to room near nurses station  Outcome: Progressing  Goal: Maintain or return to baseline ADL function  Description: INTERVENTIONS:  - Assess patient's ability to carry out ADLs; assess patient's baseline for ADL function and identify physical deficits which impact ability to perform ADLs (bathing, care of mouth/teeth, toileting, grooming, dressing, etc.)  - Assess/evaluate cause of self-care deficits   - Assess range of motion  - Assess patient's mobility; develop plan if impaired  - Assess patient's need for assistive devices and provide as appropriate  - Encourage maximum independence but intervene and supervise when necessary  - Involve family in performance of ADLs  - Assess for home care needs following discharge   - Consider OT consult to assist with ADL evaluation and planning for discharge  - Provide patient education as appropriate  Outcome: Progressing  Goal: Maintains/Returns to pre admission functional level  Description: INTERVENTIONS:  - Perform BMAT or MOVE assessment daily.   - Set and communicate daily mobility goal to care team and patient/family/caregiver.    - Collaborate with rehabilitation services on mobility goals if consulted  - Ambulate patient 3 times a day  - Out of bed to chair 3 times a day   - Out of bed for meals 3 times a day  - Out of bed for toileting  - Record patient progress and toleration of activity level   Outcome: Progressing     Problem: DISCHARGE PLANNING  Goal: Discharge to home or other facility with appropriate resources  Description: INTERVENTIONS:  - Identify barriers to discharge w/patient and caregiver  - Arrange for needed discharge resources and transportation as appropriate  - Identify discharge learning needs (meds, wound care, etc.)  - Arrange for interpretive services to assist at discharge as needed  - Refer to Case Management Department for coordinating discharge planning if the patient needs post-hospital services based on physician/advanced practitioner order or complex needs related to functional status, cognitive ability, or social support system  Outcome: Progressing     Problem: Knowledge Deficit  Goal: Patient/family/caregiver demonstrates understanding of disease process, treatment plan, medications, and discharge instructions  Description: Complete learning assessment and assess knowledge base.   Interventions:  - Provide teaching at level of understanding  - Provide teaching via preferred learning methods  Outcome: Progressing

## 2023-10-11 NOTE — PROGRESS NOTES
Progress Note - General Surgery   Lisseth Wright 47 y.o. male MRN: [id number]  Unit/Bed#: -01 Encounter: [id number]    Assessment:  47 y.o. male w/ RUQ abdominal pain, rule out acute cholecystitis    - HIDA: Normal hepatobiliary study consistent with patency of the cystic duct and without scintigraphic evidence of acute cholecystitis. - US with findings equivocal for acute cholecystitis   - CT pertinent findings - Mild infiltration of the peritoneal fat in the right upper quadrant adjacent to the inferior aspect of the liver/gallbladder fundus without calcified cholelithiasis or evidence of acute cholecystitis, possibly reactive to the bowel process, although slightly remote from it. - Afebrile, VSS on room air - HTN has improved, now 146/93  - WBC 10.21 (12.40), Hgb 15 (17)  - LFTs WNL  - Tbili 1.01 (1.08)   - Lactic Acid 1.2   - PMHx: HTN, ptsd, HAs  - PSHx: Umbilical hernia repair x 2, LIH repair, Left rotator cuff repair     Plan:  - Patient continues with intermittent severe RUQ abdominal pain, some nausea, without vomiting. HIDA scan results noted and discussed with patient and wife at bedside   - Patient requests if there is any chance of intervention assisting with pain that this be performed during current stay. Explained with normal HIDA scan cholecystectomy is not typically indicated - acknowledging his pain remains severe - briefly discussing risk vs benefit   - Dr. Keegan Du to further discuss with patient and wife once available   - Keep NPO until seen by Dr. Keegan Du  - IVF hydration   - Pain/nausea control PRN  - Serial exams  - SCDs    Subjective:   Patient states he does not feel well this morning. He was able to tolerate some clear liquids yesterday but had severe pain and nausea about an hour after. He denies any vomiting. Patient has been passing gas without bowel movement. He has been urinating without issue. Patient is hopeful for intervention to assist with pain.   He also voices some frustration and is hopeful for discharge soon. Objective:   Blood pressure 146/93, pulse 65, temperature 98.2 °F (36.8 °C), resp. rate 18, height 6' 1" (1.854 m), weight 106 kg (234 lb 5.6 oz), SpO2 95 %. ,Body mass index is 30.92 kg/m². Intake/Output Summary (Last 24 hours) at 10/11/2023 0840  Last data filed at 10/11/2023 0338  Gross per 24 hour   Intake 2477.92 ml   Output --   Net 2477.92 ml     Invasive Devices       Peripheral Intravenous Line  Duration             Peripheral IV 10/10/23 Dorsal (posterior); Left Forearm <1 day                  Physical Exam:   General: no acute distress, pt appears well and comfortable, wife at bedside  Skin: warm and dry to touch  Pulmonary: normal effort  Abdominal: Soft, non-distended, tender to palpation in RUQ otherwise non-tender  Neuro: alert and oriented     Lab, Imaging and other studies:I have personally reviewed pertinent lab results.     CBC:   Lab Results   Component Value Date    WBC 10.21 (H) 10/11/2023    HGB 15.9 10/11/2023    HCT 45.7 10/11/2023    MCV 86 10/11/2023     10/11/2023    RBC 5.33 10/11/2023    MCH 29.8 10/11/2023    MCHC 34.8 10/11/2023    RDW 12.9 10/11/2023    MPV 10.3 10/11/2023   CMP:   Lab Results   Component Value Date    SODIUM 139 10/11/2023    K 3.9 10/11/2023     10/11/2023    CO2 25 10/11/2023    BUN 10 10/11/2023    CREATININE 0.76 10/11/2023    CALCIUM 9.1 10/11/2023    AST 16 10/11/2023    ALT 31 10/11/2023    ALKPHOS 48 10/11/2023    EGFR 104 10/11/2023     VTE Pharmacologic Prophylaxis: Hold for possible OR  VTE Mechanical Prophylaxis: sequential compression device    Brunilda Prasad PA-C  10/11/2023

## 2023-10-12 ENCOUNTER — APPOINTMENT (INPATIENT)
Dept: ULTRASOUND IMAGING | Facility: HOSPITAL | Age: 53
DRG: 392 | End: 2023-10-12
Payer: OTHER GOVERNMENT

## 2023-10-12 LAB
BACTERIA UR QL AUTO: ABNORMAL /HPF
BILIRUB UR QL STRIP: NEGATIVE
CLARITY UR: CLEAR
COLOR UR: YELLOW
ERYTHROCYTE [DISTWIDTH] IN BLOOD BY AUTOMATED COUNT: 12.6 % (ref 11.6–15.1)
GLUCOSE UR STRIP-MCNC: NEGATIVE MG/DL
HCT VFR BLD AUTO: 46.1 % (ref 36.5–49.3)
HGB BLD-MCNC: 16.2 G/DL (ref 12–17)
HGB UR QL STRIP.AUTO: NEGATIVE
KETONES UR STRIP-MCNC: NEGATIVE MG/DL
LEUKOCYTE ESTERASE UR QL STRIP: NEGATIVE
MCH RBC QN AUTO: 30.1 PG (ref 26.8–34.3)
MCHC RBC AUTO-ENTMCNC: 35.1 G/DL (ref 31.4–37.4)
MCV RBC AUTO: 86 FL (ref 82–98)
NITRITE UR QL STRIP: NEGATIVE
NON-SQ EPI CELLS URNS QL MICRO: ABNORMAL /HPF
PH UR STRIP.AUTO: 7 [PH]
PLATELET # BLD AUTO: 269 THOUSANDS/UL (ref 149–390)
PMV BLD AUTO: 10.2 FL (ref 8.9–12.7)
PROCALCITONIN SERPL-MCNC: 0.11 NG/ML
PROT UR STRIP-MCNC: NEGATIVE MG/DL
RBC # BLD AUTO: 5.38 MILLION/UL (ref 3.88–5.62)
RBC #/AREA URNS AUTO: ABNORMAL /HPF
SP GR UR STRIP.AUTO: 1.01 (ref 1–1.03)
UROBILINOGEN UR QL STRIP.AUTO: 4 E.U./DL
WBC # BLD AUTO: 10.06 THOUSAND/UL (ref 4.31–10.16)
WBC #/AREA URNS AUTO: ABNORMAL /HPF

## 2023-10-12 PROCEDURE — 81001 URINALYSIS AUTO W/SCOPE: CPT | Performed by: SPECIALIST

## 2023-10-12 PROCEDURE — 84145 PROCALCITONIN (PCT): CPT | Performed by: SPECIALIST

## 2023-10-12 PROCEDURE — 76705 ECHO EXAM OF ABDOMEN: CPT

## 2023-10-12 PROCEDURE — 85027 COMPLETE CBC AUTOMATED: CPT

## 2023-10-12 PROCEDURE — C9113 INJ PANTOPRAZOLE SODIUM, VIA: HCPCS | Performed by: SPECIALIST

## 2023-10-12 RX ORDER — KETOROLAC TROMETHAMINE 30 MG/ML
30 INJECTION, SOLUTION INTRAMUSCULAR; INTRAVENOUS ONCE
Status: COMPLETED | OUTPATIENT
Start: 2023-10-12 | End: 2023-10-12

## 2023-10-12 RX ORDER — KETOROLAC TROMETHAMINE 30 MG/ML
15 INJECTION, SOLUTION INTRAMUSCULAR; INTRAVENOUS EVERY 6 HOURS PRN
Status: DISCONTINUED | OUTPATIENT
Start: 2023-10-12 | End: 2023-10-14 | Stop reason: HOSPADM

## 2023-10-12 RX ADMIN — SODIUM CHLORIDE, SODIUM LACTATE, POTASSIUM CHLORIDE, AND CALCIUM CHLORIDE 75 ML/HR: .6; .31; .03; .02 INJECTION, SOLUTION INTRAVENOUS at 09:05

## 2023-10-12 RX ADMIN — HYDROCHLOROTHIAZIDE 50 MG: 25 TABLET ORAL at 09:21

## 2023-10-12 RX ADMIN — KETOROLAC TROMETHAMINE 30 MG: 30 INJECTION, SOLUTION INTRAMUSCULAR; INTRAVENOUS at 00:53

## 2023-10-12 RX ADMIN — NICOTINE 1 PATCH: 21 PATCH, EXTENDED RELEASE TRANSDERMAL at 09:27

## 2023-10-12 RX ADMIN — PANTOPRAZOLE SODIUM 40 MG: 40 INJECTION, POWDER, FOR SOLUTION INTRAVENOUS at 09:20

## 2023-10-12 RX ADMIN — SODIUM CHLORIDE, SODIUM LACTATE, POTASSIUM CHLORIDE, AND CALCIUM CHLORIDE 75 ML/HR: .6; .31; .03; .02 INJECTION, SOLUTION INTRAVENOUS at 22:53

## 2023-10-12 RX ADMIN — KETOROLAC TROMETHAMINE 15 MG: 30 INJECTION, SOLUTION INTRAMUSCULAR; INTRAVENOUS at 20:59

## 2023-10-12 NOTE — UTILIZATION REVIEW
Initial Clinical Review  Observation on 10/10 @ 1037 upgraded to Inpatient on 10/12 @ 1523 d/t continued RUQ pain and nausea management. Admission: Date/Time/Statement:   Admission Orders (From admission, onward)       Ordered        10/12/23 1523  Inpatient Admission  Once            10/10/23 1037  Place in Observation  Once                          Orders Placed This Encounter   Procedures    Inpatient Admission     Standing Status:   Standing     Number of Occurrences:   1     Order Specific Question:   Level of Care     Answer:   Med Surg [16]     Order Specific Question:   Estimated length of stay     Answer:   More than 2 Midnights     Order Specific Question:   Certification     Answer:   I certify that inpatient services are medically necessary for this patient for a duration of greater than two midnights. See H&P and MD Progress Notes for additional information about the patient's course of treatment. ED Arrival Information       Expected   -    Arrival   10/10/2023 06:08    Acuity   Urgent              Means of arrival   Walk-In    Escorted by   Self    Service   Surgery-General    Admission type   Emergency              Arrival complaint   abd pain             Chief Complaint   Patient presents with    Abdominal Pain     Pt feeling ill with flu like sx since Saturday. Pt having abd pain since yesterday. +nausea       Initial Presentation: 47 y.o. male w/ PMH of HTN presented to the ED from home w/ severe worsening RUQ abdominal pain x 2 days associated w/ nausea, fever, chills, body aches and constipation. Pt reports pain is constant, aggrevated by lying on his side or palpating in the RUQ of his abdomen. In the ED, /101. WBC 12.40. US was equivical for cholecystitis with minimal pericholecystic fluid and no stones or wall thickening. CT showed signs of possible mild enterocolitis. Lab studies showed mild leukocytosis, otherwise normal. On exam, Tenderness to deep palpation in the RUQ. Given 1L IVF bolus, IV Toradol. Admit as observation level of care for RUQ abdominal pain, rule out acute cholecystitis. Plan: CLD. NPO after midnight for HIDA scan tomorrow. IV fluids  Analgesics/antiemetics. Serial abdominal exams. Trend labs    10/11 Gen Surg Notes: Pt not feeling well this am. Continues to have intermittent severe RUQ pain, nausea. Tolerated clear liqs yesterday but had severe pain and nausea 1 hr after. HIDA: Normal hepatobiliary study consistent with patency of the cystic duct and without scintigraphic evidence of acute cholecystitis. Keep NPO. IVF hydration. Pain/nausea control prn. Serial exams. SCDs. Started on IV protonix. PE: abd soft, nt, bhavna to palpation in RUQ.    10/12 Obs to IP  Gen Surg Notes: Pt continues to have intermittant RUQ severe abd pain. has been passing gas without bowel movement. He has been urinating without issue. NPO, sips with meds. Repeat US. Await GI consult. IVF. Pain and nausea control prn. Serial exams. Cont IV protonix. Date: 10/13   Day 2:   GI Consult: Acute onset RUQ pain. Exam is suggestive of Costochondritis. Cannot r/o PUD:  Plan: sched for EGD. Use heating pads for his RUQ Pain. Further management will be based on the EGD findings. Gen Surg Notes: Pt reports continues to experience pain following eating and intermittently. Had some nausea. NPO since midnight in anticipation of EGD. If no significant findings on EGD will plan to move forward with cholecystectomy. Remains NPO. IVF hydration. Plan for IV abx on call to OR if surgical intervention is needed. Pain/nausea control PRN. Serial exams.  SCDs  PE: abd soft, nd, remains tender to palpation in RUQ     DATE OF SERVICE: 10/13/23  PROCEDURE: EGD   FINDINGS:  Regular Z-line 43 cm from the incisors  Small hiatal hernia  Erythematous mucosa in the fundus of the stomach, body of the stomach and antrum, consistent with gastritis; performed cold forceps biopsy to rule out H. pylori  Erythematous mucosa in the duodenal bulb; performed cold forceps biopsy  The 2nd part of the duodenum appeared normal.  IMPRESSION:  Small hiatal hernia  Abnormal mucosa; performed cold forceps biopsies  The 2nd part of the duodenum appeared normal.     RECOMMENDATION:    Return to the floor for ongoing care  Avoid NSAIDs  Use PPI and Carafate for 2-4 weeks  Advance diet as tolerated         ED Triage Vitals   Temperature Pulse Respirations Blood Pressure SpO2   10/10/23 0615 10/10/23 0615 10/10/23 0615 10/10/23 0615 10/10/23 0615   97.8 °F (36.6 °C) 97 19 (!) 194/101 97 %      Temp Source Heart Rate Source Patient Position - Orthostatic VS BP Location FiO2 (%)   10/10/23 0615 10/10/23 0615 10/10/23 0615 10/10/23 0615 --   Temporal Monitor Lying Left arm       Pain Score       10/10/23 0641       4          Wt Readings from Last 1 Encounters:   10/10/23 106 kg (234 lb 5.6 oz)     Additional Vital Signs:   Date/Time Temp Pulse Resp BP MAP (mmHg) SpO2 O2 Device Patient Position - Orthostatic VS   10/13/23 07:39:59 97.9 °F (36.6 °C) 59 16 158/91 113 94 % -- --   10/12/23 22:52:47 97.9 °F (36.6 °C) 58 18 129/63 85 94 % -- --   10/12/23 2023 -- -- -- -- -- -- None (Room air) --     Date/Time Temp Pulse Resp BP MAP (mmHg) SpO2 O2 Device Patient Position - Orthostatic VS   10/12/23 15:08:25 -- 65 24 Abnormal  163/102 Abnormal  122 97 % -- --   10/12/23 09:22:56 98.6 °F (37 °C) 69 16 150/88 109 94 % -- --   10/12/23 0920 -- -- -- -- -- -- None (Room air) --   10/12/23 06:23:19 98.1 °F (36.7 °C) 60 18 156/94 115 89 % Abnormal  -- --   10/11/23 23:18:12 97.5 °F (36.4 °C) 65 18 130/73 92 93 % None (Room air) Lying   10/11/23 2002 -- -- -- -- -- -- None (Room air) --   10/11/23 15:28:22 98.1 °F (36.7 °C) 80 19 160/105 Abnormal  123 94 % -- --   10/11/23 08:00:17 98.2 °F (36.8 °C) 65 15 146/93 111 95 % None (Room air) Sitting   10/10/23 22:51:24 97.9 °F (36.6 °C) 64 18 138/78 98 94 % -- --   10/10/23 1932 -- -- -- -- -- -- None (Room air) --   10/10/23 15:41:56 98.6 °F (37 °C) 84 17 152/96 115 94 % -- --   10/10/23 14:49:50 -- 84 -- 175/96 Abnormal  122 94 % -- --   10/10/23 11:02:08 98.1 °F (36.7 °C) 91 16 176/101 Abnormal  126 96 % -- --   10/10/23 0700 -- 86 16 151/79 103 94 % None (Room air) --   10/10/23 0636 -- 96 18 162/88 -- 95 % None (Room air) --       Pertinent Labs/Diagnostic Test Results:   US right upper quadrant   Final Result by Karla Dorado MD (10/12 9391)      1. Small amount of gallbladder sludge. No findings of acute cholecystitis. No biliary ductal dilation. 2. Hepatomegaly and marked hepatic steatosis. Workstation performed: [id number]         NM hepatobiliary   Final Result by Silvina Fernández MD (10/11 [phone])      Normal hepatobiliary study consistent with patency of the cystic duct and without scintigraphic evidence of acute cholecystitis. Workstation performed: [id number]         US right upper quadrant   Final Result by Mikki Ni MD (10/10 [phone])      Nondistended gallbladder containing a small amount of sludge with minimal pericholecystic fluid, but no wall thickening. Tenderness throughout the region was elicited, but not specific to the gallbladder. Findings are equivocal for acute cholecystitis. If there is continued clinical concern, HIDA scan can be obtained. Hepatic steatosis. The study was marked in [facility] for immediate notification. Workstation performed: [id number]         CT abdomen pelvis with contrast   Final Result by Mikki Ni MD (10/10 1230)      1. Findings suggesting mild enterocolitis in the appropriate clinical setting. 2. Mild infiltration of the peritoneal fat in the right upper quadrant adjacent to the inferior aspect of the liver/gallbladder fundus without calcified cholelithiasis or evidence of acute cholecystitis, possibly reactive to the bowel process, although    slightly remote from it.       3. Hepatic steatosis.             Workstation performed: [id number]               Results from last 7 days   Lab Units 10/12/23  0616 10/11/23  0532 10/10/23  0633   WBC Thousand/uL 10.06 10.21* 12.40*   HEMOGLOBIN g/dL 16.2 15.9 17.4*   HEMATOCRIT % 46.1 45.7 49.6*   PLATELETS Thousands/uL 269 237 242   NEUTROS ABS Thousands/µL  --   --  8.54*         Results from last 7 days   Lab Units 10/11/23  0532 10/10/23  0633   SODIUM mmol/L 139 137   POTASSIUM mmol/L 3.9 3.7   CHLORIDE mmol/L 107 107   CO2 mmol/L 25 22   ANION GAP mmol/L 7 8   BUN mg/dL 10 12   CREATININE mg/dL 0.76 0.79   EGFR ml/min/1.73sq m 104 103   CALCIUM mg/dL 9.1 9.4     Results from last 7 days   Lab Units 10/11/23  0532 10/10/23  0633   AST U/L 16 18   ALT U/L 31 39   ALK PHOS U/L 48 57   TOTAL PROTEIN g/dL 6.4 7.3   ALBUMIN g/dL 4.0 4.6   TOTAL BILIRUBIN mg/dL 1.01* 1.08*   BILIRUBIN DIRECT mg/dL  --  0.24*         Results from last 7 days   Lab Units 10/11/23  0532 10/10/23  0633   GLUCOSE RANDOM mg/dL 101 153*             Results from last 7 days   Lab Units 10/10/23  0633   PROTIME seconds 14.5   INR  1.10         Results from last 7 days   Lab Units 10/12/23  1056   PROCALCITONIN ng/ml 0.11     Results from last 7 days   Lab Units 10/10/23  1802   LACTIC ACID mmol/L 1.2             Results from last 7 days   Lab Units 10/10/23  0633   LIPASE u/L 11                 Results from last 7 days   Lab Units 10/12/23  1212   CLARITY UA  Clear   COLOR UA  Yellow   SPEC GRAV UA  1.015   PH UA  7.0   GLUCOSE UA mg/dl Negative   KETONES UA mg/dl Negative   BLOOD UA  Negative   PROTEIN UA mg/dl Negative   NITRITE UA  Negative   BILIRUBIN UA  Negative   UROBILINOGEN UA E.U./dl 4.0*   LEUKOCYTES UA  Negative   WBC UA /hpf None Seen   RBC UA /hpf None Seen   BACTERIA UA /hpf None Seen   EPITHELIAL CELLS WET PREP /hpf None Seen               ED Treatment:   Medication Administration from 10/10/2023 0607 to 10/10/2023 1052         Date/Time Order Dose Route Action 10/10/2023 1039 EDT multi-electrolyte (ISOLYTE-S PH 7.4) bolus 1,000 mL 0 mL Intravenous Stopped     10/10/2023 0634 EDT multi-electrolyte (ISOLYTE-S PH 7.4) bolus 1,000 mL 1,000 mL Intravenous New Bag     10/10/2023 0641 EDT ketorolac (TORADOL) injection 15 mg 15 mg Intravenous Given     10/10/2023 0723 EDT iohexol (OMNIPAQUE) 350 MG/ML injection (MULTI-DOSE) 100 mL 100 mL Intravenous Given          Past Medical History:   Diagnosis Date    PTSD (post-traumatic stress disorder)      Present on Admission:  **None**      Admitting Diagnosis: Abdominal pain [R10.9]  RUQ abdominal pain [R10.11]  Age/Sex: 47 y.o. male  Admission Orders:  SCD  I/O  NPO  Scheduled Medications:  hydrochlorothiazide, 50 mg, Oral, Daily  nicotine, 1 patch, Transdermal, Daily  pantoprazole, 40 mg, Intravenous, Q24H [address]      Continuous IV Infusions:  lactated ringers, 75 mL/hr, Intravenous, Continuous      PRN Meds:  acetaminophen, 650 mg, Oral, Q4H PRN 10/10 x 1, 10/13 x 1  ketorolac, 15 mg, Intravenous, Q6H PRN 10/10 x 1, 10/11 x 1, 10/12 x 1, 10/13 x 1  morphine injection, 4 mg, Intravenous, Q4H PRN  ondansetron, 4 mg, Intravenous, Q6H PRN        IP CONSULT TO GASTROENTEROLOGY    Network Utilization Review Department  ATTENTION: Please call with any questions or concerns to [phone] and carefully listen to the prompts so that you are directed to the right person. All voicemails are confidential.   For Discharge needs, contact Care Management DC Support Team at [phone] opt. 2  Send all requests for admission clinical reviews, approved or denied determinations and any other requests to dedicated fax number below belonging to the campus where the patient is receiving treatment.  List of dedicated fax numbers for the Facilities:  [place] DENIALS (Administrative/Medical Necessity) [phone]   DISCHARGE SUPPORT TEAM [phone] [facility] (Maternity/NICU/Pediatrics) [phone] [address] [phone] 150[address] [facility] [address] [address] [facility] [address] [place] [address] [address] [phone]

## 2023-10-12 NOTE — PROGRESS NOTES
Progress Note - General Surgery   Raeann Callejas 47 y.o. male MRN: [id number]  Unit/Bed#: -01 Encounter: [id number]    Assessment:  47 y.o. male w/ RUQ abdominal pain, rule out acute cholecystitis  -Pt continues to have intermittant severe RUQ pain, aggravated by eating.  -Hx of GERD, rule out PUD    - HIDA: Normal hepatobiliary study consistent with patency of the cystic duct and without scintigraphic evidence of acute cholecystitis. - US with findings equivocal for acute cholecystitis -Nondistended gallbladder containing a small amount of sludge with minimal pericholecystic fluid, but no wall thickening.     - CT pertinent findings - No calcified gallstones. No pericholecystic inflammatory change. Mild infiltration of the peritoneal fat in the right upper quadrant adjacent to the inferior aspect of the liver/gallbladder fundus without calcified cholelithiasis or evidence of acute cholecystitis, possibly reactive to the bowel process, although slightly remote from it. - Afebrile, VSS on room air - HTN has improved, now 146/93  - WBC 10 (10.21, 12.40), Hgb 16 (15,17)  - LFTs WNL, Tbili 1.01 yesterday    - PMHx: HTN, ptsd, HAs  - PSHx: Umbilical hernia repair x 2, LIH repair, Left rotator cuff repair     Plan:  - Patient continues with intermittent severe RUQ abdominal pain, some nausea, without vomiting. Patient requests if there is any chance of intervention assisting with pain that this be performed during current stay. -NPO, sips with meds  -Repeat US  - Await GI consult  - IVF hydration   - Pain/nausea control PRN  - Serial exams  - SCDs    Subjective:   Patient states he does not feel well this morning. He continues to have intermittant RUQ severe abd pain. He denies any vomiting. Patient has been passing gas without bowel movement. He has been urinating without issue. Patient is hopeful for intervention to assist with pain.       Objective:   Blood pressure 156/94, pulse 60, temperature 98.1 °F (36.7 °C), temperature source Temporal, resp. rate 18, height 6' 1" (1.854 m), weight 106 kg (234 lb 5.6 oz), SpO2 (!) 89 %. ,Body mass index is 30.92 kg/m². Intake/Output Summary (Last 24 hours) at 10/12/2023 0718  Last data filed at 10/11/2023 1827  Gross per 24 hour   Intake 300 ml   Output --   Net 300 ml     Invasive Devices       Peripheral Intravenous Line  Duration             Peripheral IV 10/10/23 Dorsal (posterior); Left Forearm 1 day                  Physical Exam:   General: no acute distress, pt appears well and comfortable, wife at bedside  Skin: warm and dry to touch  Pulmonary: normal effort  Abdominal: Soft, non-distended, tender to palpation in RUQ otherwise non-tender  Neuro: alert and oriented     Lab, Imaging and other studies:I have personally reviewed pertinent lab results.     CBC:   Lab Results   Component Value Date    WBC 10.06 10/12/2023    HGB 16.2 10/12/2023    HCT 46.1 10/12/2023    MCV 86 10/12/2023     10/12/2023    RBC 5.38 10/12/2023    MCH 30.1 10/12/2023    MCHC 35.1 10/12/2023    RDW 12.6 10/12/2023    MPV 10.2 10/12/2023   CMP:   No results found for: "SODIUM", "K", "CL", "CO2", "ANIONGAP", "BUN", "CREATININE", "GLUCOSE", "CALCIUM", "AST", "ALT", "ALKPHOS", "PROT", "BILITOT", "EGFR"    VTE Pharmacologic Prophylaxis: Hold for possible OR  VTE Mechanical Prophylaxis: sequential compression device    Wei Alvarado PA-C  10/12/2023

## 2023-10-12 NOTE — NURSING NOTE
Patient requesting note be written. Patient states that a sharp stabbing pain, rating >10/10 pain, to the right abdomen woke him out of sleep, this pain decreased to a dull stabbing pain when sitting up but remained requiring pain medication. Patient stated that the pain also took his breath away because it was that intense. Patient is requesting a repeat CT or ultrasound of the abdomen to evaluate for continued inflamed gallbladder.

## 2023-10-12 NOTE — PLAN OF CARE
Problem: GASTROINTESTINAL - ADULT  Goal: Minimal or absence of nausea and/or vomiting  Description: INTERVENTIONS:  - Administer IV fluids if ordered to ensure adequate hydration  - Maintain NPO status until nausea and vomiting are resolved  - Nasogastric tube if ordered  - Administer ordered antiemetic medications as needed  - Provide nonpharmacologic comfort measures as appropriate  - Advance diet as tolerated, if ordered  - Consider nutrition services referral to assist patient with adequate nutrition and appropriate food choices  Outcome: Progressing     Problem: PAIN - ADULT  Goal: Verbalizes/displays adequate comfort level or baseline comfort level  Description: Interventions:  - Encourage patient to monitor pain and request assistance  - Assess pain using appropriate pain scale  - Administer analgesics based on type and severity of pain and evaluate response  - Implement non-pharmacological measures as appropriate and evaluate response  - Consider cultural and social influences on pain and pain management  - Notify physician/advanced practitioner if interventions unsuccessful or patient reports new pain  Outcome: Progressing     Problem: SAFETY ADULT  Goal: Patient will remain free of falls  Description: INTERVENTIONS:  - Educate patient/family on patient safety including physical limitations  - Instruct patient to call for assistance with activity   - Consult OT/PT to assist with strengthening/mobility   - Keep Call bell within reach  - Keep bed low and locked with side rails adjusted as appropriate  - Keep care items and personal belongings within reach  - Initiate and maintain comfort rounds  - Make Fall Risk Sign visible to staff  - Apply yellow socks and bracelet for high fall risk patients  - Consider moving patient to room near nurses station  Outcome: Progressing  Goal: Maintain or return to baseline ADL function  Description: INTERVENTIONS:  -  Assess patient's ability to carry out ADLs; assess patient's baseline for ADL function and identify physical deficits which impact ability to perform ADLs (bathing, care of mouth/teeth, toileting, grooming, dressing, etc.)  - Assess/evaluate cause of self-care deficits   - Assess range of motion  - Assess patient's mobility; develop plan if impaired  - Assess patient's need for assistive devices and provide as appropriate  - Encourage maximum independence but intervene and supervise when necessary  - Involve family in performance of ADLs  - Assess for home care needs following discharge   - Consider OT consult to assist with ADL evaluation and planning for discharge  - Provide patient education as appropriate  Outcome: Progressing  Goal: Maintains/Returns to pre admission functional level  Description: INTERVENTIONS:  - Perform BMAT or MOVE assessment daily.   - Set and communicate daily mobility goal to care team and patient/family/caregiver. - Collaborate with rehabilitation services on mobility goals if consulted  - Perform Range of Motion 3 times a day. - Reposition patient every 3 hours.   - Dangle patient 3 times a day  - Stand patient 3 times a day  - Ambulate patient 3 times a day  - Out of bed to chair 3 times a day   - Out of bed for meals 3 times a day  - Out of bed for toileting  - Record patient progress and toleration of activity level   Outcome: Progressing     Problem: DISCHARGE PLANNING  Goal: Discharge to home or other facility with appropriate resources  Description: INTERVENTIONS:  - Identify barriers to discharge w/patient and caregiver  - Arrange for needed discharge resources and transportation as appropriate  - Identify discharge learning needs (meds, wound care, etc.)  - Arrange for interpretive services to assist at discharge as needed  - Refer to Case Management Department for coordinating discharge planning if the patient needs post-hospital services based on physician/advanced practitioner order or complex needs related to functional status, cognitive ability, or social support system  Outcome: Progressing     Problem: Knowledge Deficit  Goal: Patient/family/caregiver demonstrates understanding of disease process, treatment plan, medications, and discharge instructions  Description: Complete learning assessment and assess knowledge base.   Interventions:  - Provide teaching at level of understanding  - Provide teaching via preferred learning methods  Outcome: Progressing

## 2023-10-12 NOTE — PLAN OF CARE
Problem: GASTROINTESTINAL - ADULT  Goal: Minimal or absence of nausea and/or vomiting  Description: INTERVENTIONS:  - Administer IV fluids if ordered to ensure adequate hydration  - Maintain NPO status until nausea and vomiting are resolved  - Nasogastric tube if ordered  - Administer ordered antiemetic medications as needed  - Provide nonpharmacologic comfort measures as appropriate  - Advance diet as tolerated, if ordered  - Consider nutrition services referral to assist patient with adequate nutrition and appropriate food choices  Outcome: Progressing     Problem: PAIN - ADULT  Goal: Verbalizes/displays adequate comfort level or baseline comfort level  Description: Interventions:  - Encourage patient to monitor pain and request assistance  - Assess pain using appropriate pain scale  - Administer analgesics based on type and severity of pain and evaluate response  - Implement non-pharmacological measures as appropriate and evaluate response  - Consider cultural and social influences on pain and pain management  - Notify physician/advanced practitioner if interventions unsuccessful or patient reports new pain  Outcome: Progressing     Problem: SAFETY ADULT  Goal: Patient will remain free of falls  Description: INTERVENTIONS:  - Educate patient/family on patient safety including physical limitations  - Instruct patient to call for assistance with activity   - Consult OT/PT to assist with strengthening/mobility   - Keep Call bell within reach  - Keep bed low and locked with side rails adjusted as appropriate  - Keep care items and personal belongings within reach  - Initiate and maintain comfort rounds  - Make Fall Risk Sign visible to staff  - Offer Toileting every 1 Hours, in advance of need  - Initiate/Maintain bed/chair alarm  - Obtain necessary fall risk management equipment  - Apply yellow socks and bracelet for high fall risk patients  - Consider moving patient to room near nurses station  Outcome: Progressing  Goal: Maintain or return to baseline ADL function  Description: INTERVENTIONS:  -  Assess patient's ability to carry out ADLs; assess patient's baseline for ADL function and identify physical deficits which impact ability to perform ADLs (bathing, care of mouth/teeth, toileting, grooming, dressing, etc.)  - Assess/evaluate cause of self-care deficits   - Assess range of motion  - Assess patient's mobility; develop plan if impaired  - Assess patient's need for assistive devices and provide as appropriate  - Encourage maximum independence but intervene and supervise when necessary  - Involve family in performance of ADLs  - Assess for home care needs following discharge   - Consider OT consult to assist with ADL evaluation and planning for discharge  - Provide patient education as appropriate  Outcome: Progressing  Goal: Maintains/Returns to pre admission functional level  Description: INTERVENTIONS:  - Perform BMAT or MOVE assessment daily.   - Set and communicate daily mobility goal to care team and patient/family/caregiver. - Collaborate with rehabilitation services on mobility goals if consulted  - Perform Range of Motion 3 times a day. - Reposition patient every 2 hours.   - Dangle patient 3 times a day  - Stand patient 3 times a day  - Ambulate patient 3 times a day  - Out of bed to chair 3 times a day   - Out of bed for meals 3 times a day  - Out of bed for toileting  - Record patient progress and toleration of activity level   Outcome: Progressing     Problem: DISCHARGE PLANNING  Goal: Discharge to home or other facility with appropriate resources  Description: INTERVENTIONS:  - Identify barriers to discharge w/patient and caregiver  - Arrange for needed discharge resources and transportation as appropriate  - Identify discharge learning needs (meds, wound care, etc.)  - Arrange for interpretive services to assist at discharge as needed  - Refer to Case Management Department for coordinating discharge planning if the patient needs post-hospital services based on physician/advanced practitioner order or complex needs related to functional status, cognitive ability, or social support system  Outcome: Progressing     Problem: Knowledge Deficit  Goal: Patient/family/caregiver demonstrates understanding of disease process, treatment plan, medications, and discharge instructions  Description: Complete learning assessment and assess knowledge base.   Interventions:  - Provide teaching at level of understanding  - Provide teaching via preferred learning methods  Outcome: Progressing

## 2023-10-13 ENCOUNTER — ANESTHESIA (OUTPATIENT)
Dept: PERIOP | Facility: HOSPITAL | Age: 53
End: 2023-10-13

## 2023-10-13 ENCOUNTER — ANESTHESIA EVENT (INPATIENT)
Dept: GASTROENTEROLOGY | Facility: HOSPITAL | Age: 53
DRG: 392 | End: 2023-10-13
Payer: OTHER GOVERNMENT

## 2023-10-13 ENCOUNTER — ANESTHESIA EVENT (OUTPATIENT)
Dept: PERIOP | Facility: HOSPITAL | Age: 53
End: 2023-10-13

## 2023-10-13 PROBLEM — K29.00 ACUTE SUPERFICIAL GASTRITIS WITHOUT HEMORRHAGE: Status: ACTIVE | Noted: 2023-10-13

## 2023-10-13 PROBLEM — I10 PRIMARY HYPERTENSION: Status: ACTIVE | Noted: 2023-10-13

## 2023-10-13 PROBLEM — Z87.898 HISTORY OF ANESTHESIA COMPLICATIONS: Status: ACTIVE | Noted: 2023-10-13

## 2023-10-13 PROBLEM — Z87.898 HISTORY OF EMERGENCE DELIRIUM: Status: ACTIVE | Noted: 2023-10-13

## 2023-10-13 PROBLEM — F43.10 PTSD (POST-TRAUMATIC STRESS DISORDER): Status: ACTIVE | Noted: 2023-10-13

## 2023-10-13 PROBLEM — R10.11 RUQ ABDOMINAL PAIN: Status: ACTIVE | Noted: 2023-10-13

## 2023-10-13 PROCEDURE — 0DB68ZX EXCISION OF STOMACH, VIA NATURAL OR ARTIFICIAL OPENING ENDOSCOPIC, DIAGNOSTIC: ICD-10-PCS | Performed by: HOSPITALIST

## 2023-10-13 PROCEDURE — 88305 TISSUE EXAM BY PATHOLOGIST: CPT | Performed by: SPECIALIST

## 2023-10-13 PROCEDURE — C9113 INJ PANTOPRAZOLE SODIUM, VIA: HCPCS | Performed by: SPECIALIST

## 2023-10-13 PROCEDURE — 0DB98ZX EXCISION OF DUODENUM, VIA NATURAL OR ARTIFICIAL OPENING ENDOSCOPIC, DIAGNOSTIC: ICD-10-PCS | Performed by: HOSPITALIST

## 2023-10-13 RX ORDER — KETOROLAC TROMETHAMINE 30 MG/ML
15 INJECTION, SOLUTION INTRAMUSCULAR; INTRAVENOUS ONCE
Status: DISCONTINUED | OUTPATIENT
Start: 2023-10-13 | End: 2023-10-13

## 2023-10-13 RX ORDER — CELECOXIB 100 MG/1
100 CAPSULE ORAL 2 TIMES DAILY
Qty: 60 CAPSULE | Refills: 0 | Status: SHIPPED | OUTPATIENT
Start: 2023-10-13 | End: 2023-11-12

## 2023-10-13 RX ORDER — SUCRALFATE 1 G/1
1 TABLET ORAL
Status: DISCONTINUED | OUTPATIENT
Start: 2023-10-13 | End: 2023-10-14 | Stop reason: HOSPADM

## 2023-10-13 RX ORDER — HALOPERIDOL 5 MG/ML
5 INJECTION INTRAMUSCULAR ONCE
Status: DISCONTINUED | OUTPATIENT
Start: 2023-10-13 | End: 2023-10-14 | Stop reason: HOSPADM

## 2023-10-13 RX ORDER — SUCRALFATE 1 G/1
1 TABLET ORAL
Qty: 56 TABLET | Refills: 0 | Status: SHIPPED | OUTPATIENT
Start: 2023-10-13 | End: 2023-10-27

## 2023-10-13 RX ORDER — PROPOFOL 10 MG/ML
INJECTION, EMULSION INTRAVENOUS AS NEEDED
Status: DISCONTINUED | OUTPATIENT
Start: 2023-10-13 | End: 2023-10-13

## 2023-10-13 RX ORDER — PANTOPRAZOLE SODIUM 40 MG/1
40 TABLET, DELAYED RELEASE ORAL
Qty: 60 TABLET | Refills: 1 | Status: SHIPPED | OUTPATIENT
Start: 2023-10-13 | End: 2023-12-12

## 2023-10-13 RX ORDER — LIDOCAINE HYDROCHLORIDE 20 MG/ML
INJECTION, SOLUTION EPIDURAL; INFILTRATION; INTRACAUDAL; PERINEURAL AS NEEDED
Status: DISCONTINUED | OUTPATIENT
Start: 2023-10-13 | End: 2023-10-13

## 2023-10-13 RX ORDER — NICOTINE 21 MG/24HR
1 PATCH, TRANSDERMAL 24 HOURS TRANSDERMAL DAILY
Qty: 28 PATCH | Refills: 0 | Status: SHIPPED | OUTPATIENT
Start: 2023-10-14

## 2023-10-13 RX ORDER — PANTOPRAZOLE SODIUM 40 MG/1
40 TABLET, DELAYED RELEASE ORAL
Status: DISCONTINUED | OUTPATIENT
Start: 2023-10-13 | End: 2023-10-14 | Stop reason: HOSPADM

## 2023-10-13 RX ORDER — MIDAZOLAM HYDROCHLORIDE 2 MG/2ML
INJECTION, SOLUTION INTRAMUSCULAR; INTRAVENOUS AS NEEDED
Status: DISCONTINUED | OUTPATIENT
Start: 2023-10-13 | End: 2023-10-13

## 2023-10-13 RX ORDER — PANTOPRAZOLE SODIUM 40 MG/1
40 TABLET, DELAYED RELEASE ORAL
Status: DISCONTINUED | OUTPATIENT
Start: 2023-10-14 | End: 2023-10-13

## 2023-10-13 RX ORDER — CELECOXIB 100 MG/1
100 CAPSULE ORAL 2 TIMES DAILY
Status: DISCONTINUED | OUTPATIENT
Start: 2023-10-13 | End: 2023-10-14 | Stop reason: HOSPADM

## 2023-10-13 RX ADMIN — PROPOFOL 50 MG: 10 INJECTION, EMULSION INTRAVENOUS at 14:57

## 2023-10-13 RX ADMIN — SODIUM CHLORIDE 20 MCG: 9 INJECTION, SOLUTION INTRAVENOUS at 14:45

## 2023-10-13 RX ADMIN — KETOROLAC TROMETHAMINE 15 MG: 30 INJECTION, SOLUTION INTRAMUSCULAR; INTRAVENOUS at 10:20

## 2023-10-13 RX ADMIN — HYDROCHLOROTHIAZIDE 50 MG: 25 TABLET ORAL at 08:47

## 2023-10-13 RX ADMIN — SODIUM CHLORIDE 20 MCG: 9 INJECTION, SOLUTION INTRAVENOUS at 14:50

## 2023-10-13 RX ADMIN — ACETAMINOPHEN 650 MG: 650 SUSPENSION ORAL at 05:39

## 2023-10-13 RX ADMIN — NICOTINE 1 PATCH: 21 PATCH, EXTENDED RELEASE TRANSDERMAL at 08:48

## 2023-10-13 RX ADMIN — PROPOFOL 50 MG: 10 INJECTION, EMULSION INTRAVENOUS at 15:01

## 2023-10-13 RX ADMIN — SUCRALFATE 1 G: 1 TABLET ORAL at 22:24

## 2023-10-13 RX ADMIN — CELECOXIB 100 MG: 100 CAPSULE ORAL at 19:58

## 2023-10-13 RX ADMIN — PROPOFOL 50 MG: 10 INJECTION, EMULSION INTRAVENOUS at 14:59

## 2023-10-13 RX ADMIN — PROPOFOL 150 MG: 10 INJECTION, EMULSION INTRAVENOUS at 14:52

## 2023-10-13 RX ADMIN — MIDAZOLAM 2 MG: 1 INJECTION INTRAMUSCULAR; INTRAVENOUS at 14:45

## 2023-10-13 RX ADMIN — PROPOFOL 50 MG: 10 INJECTION, EMULSION INTRAVENOUS at 14:55

## 2023-10-13 RX ADMIN — LIDOCAINE HYDROCHLORIDE 100 MG: 20 INJECTION, SOLUTION EPIDURAL; INFILTRATION; INTRACAUDAL; PERINEURAL at 14:51

## 2023-10-13 RX ADMIN — PANTOPRAZOLE SODIUM 40 MG: 40 INJECTION, POWDER, FOR SOLUTION INTRAVENOUS at 08:47

## 2023-10-13 RX ADMIN — SODIUM CHLORIDE, SODIUM LACTATE, POTASSIUM CHLORIDE, AND CALCIUM CHLORIDE 75 ML/HR: .6; .31; .03; .02 INJECTION, SOLUTION INTRAVENOUS at 12:52

## 2023-10-13 RX ADMIN — PROPOFOL 50 MG: 10 INJECTION, EMULSION INTRAVENOUS at 14:54

## 2023-10-13 RX ADMIN — PROPOFOL 50 MG: 10 INJECTION, EMULSION INTRAVENOUS at 14:53

## 2023-10-13 NOTE — ANESTHESIA POSTPROCEDURE EVALUATION
Post-Op Assessment Note    CV Status:  Stable    Pain management: adequate     Mental Status:  Unresponsive   Hydration Status:  Stable   PONV Controlled:  None   Airway Patency:  Patent   Two or more mitigation strategies used for obstructive sleep apnea   Post Op Vitals Reviewed: Yes      Staff: CRNA         No notable events documented.     /63 (10/13/23 1507)    Temp 97.8 °F (36.6 °C) (10/13/23 1507)    Pulse 83 (10/13/23 1507)   Resp 18 (10/13/23 1507)    SpO2 95 % (10/13/23 1507) right upper chest

## 2023-10-13 NOTE — PLAN OF CARE
Problem: GASTROINTESTINAL - ADULT  Goal: Minimal or absence of nausea and/or vomiting  Description: INTERVENTIONS:  - Administer IV fluids if ordered to ensure adequate hydration  - Maintain NPO status until nausea and vomiting are resolved  - Nasogastric tube if ordered  - Administer ordered antiemetic medications as needed  - Provide nonpharmacologic comfort measures as appropriate  - Advance diet as tolerated, if ordered  - Consider nutrition services referral to assist patient with adequate nutrition and appropriate food choices  Outcome: Progressing     Problem: PAIN - ADULT  Goal: Verbalizes/displays adequate comfort level or baseline comfort level  Description: Interventions:  - Encourage patient to monitor pain and request assistance  - Assess pain using appropriate pain scale  - Administer analgesics based on type and severity of pain and evaluate response  - Implement non-pharmacological measures as appropriate and evaluate response  - Consider cultural and social influences on pain and pain management  - Notify physician/advanced practitioner if interventions unsuccessful or patient reports new pain  Outcome: Progressing     Problem: SAFETY ADULT  Goal: Patient will remain free of falls  Description: INTERVENTIONS:  - Educate patient/family on patient safety including physical limitations  - Instruct patient to call for assistance with activity   - Consult OT/PT to assist with strengthening/mobility   - Keep Call bell within reach  - Keep bed low and locked with side rails adjusted as appropriate  - Keep care items and personal belongings within reach  - Initiate and maintain comfort rounds  - Make Fall Risk Sign visible to staff  - Offer Toileting every 1 Hours, in advance of need  - Apply yellow socks and bracelet for high fall risk patients  - Consider moving patient to room near nurses station  Outcome: Progressing  Goal: Maintain or return to baseline ADL function  Description: INTERVENTIONS:  - Assess patient's ability to carry out ADLs; assess patient's baseline for ADL function and identify physical deficits which impact ability to perform ADLs (bathing, care of mouth/teeth, toileting, grooming, dressing, etc.)  - Assess/evaluate cause of self-care deficits   - Assess range of motion  - Assess patient's mobility; develop plan if impaired  - Assess patient's need for assistive devices and provide as appropriate  - Encourage maximum independence but intervene and supervise when necessary  - Involve family in performance of ADLs  - Assess for home care needs following discharge   - Consider OT consult to assist with ADL evaluation and planning for discharge  - Provide patient education as appropriate  Outcome: Progressing  Goal: Maintains/Returns to pre admission functional level  Description: INTERVENTIONS:  - Perform BMAT or MOVE assessment daily.   - Set and communicate daily mobility goal to care team and patient/family/caregiver. - Collaborate with rehabilitation services on mobility goals if consulted  - Perform Range of Motion 3 times a day. - Reposition patient every 2 hours.   - Dangle patient 3 times a day  - Stand patient 3 times a day  - Ambulate patient 3 times a day  - Out of bed to chair 3 times a day   - Out of bed for meals 3 times a day  - Out of bed for toileting  - Record patient progress and toleration of activity level   Outcome: Progressing     Problem: DISCHARGE PLANNING  Goal: Discharge to home or other facility with appropriate resources  Description: INTERVENTIONS:  - Identify barriers to discharge w/patient and caregiver  - Arrange for needed discharge resources and transportation as appropriate  - Identify discharge learning needs (meds, wound care, etc.)  - Arrange for interpretive services to assist at discharge as needed  - Refer to Case Management Department for coordinating discharge planning if the patient needs post-hospital services based on physician/advanced practitioner order or complex needs related to functional status, cognitive ability, or social support system  Outcome: Progressing     Problem: Knowledge Deficit  Goal: Patient/family/caregiver demonstrates understanding of disease process, treatment plan, medications, and discharge instructions  Description: Complete learning assessment and assess knowledge base.   Interventions:  - Provide teaching at level of understanding  - Provide teaching via preferred learning methods  Outcome: Progressing

## 2023-10-13 NOTE — CONSULTS
Consultation - GI   Tyson Kusum Wright 47 y.o. male MRN: [id number]  Unit/Bed#: -01 Encounter: [id number]      Assessment/Plan     1. Acute onset RUQ pain. Work up is negative for Pancreatobiliary pathologies  Exam is suggestive of Costochondritis  Cannot r/o PUD      Will schedule for EGD  Use heating pads for his RUQ Pain. Further management will be based on the EGD findings. Consent: We have discussed the procedure in detail. We reviewed risks, benefits and alternative as well as protentional complications including and not limited to medication side effect , infection, bleeding, perforation and the potential need for surgery, ICU admission, CPR, as well as the need for blood product transfusion. Patient verbalized understanding and agreement. All patient questions were answered. Plan of care was discussed with the patient and primary team        History of Present Illness   Physician Requesting Consult: Rayna Canavan, MD  Reason for Consult / Principal Problem: abdominal pain  Hx and PE limited by:   HPI: Khalif Garcia is a 47y.o. year old male who was admitted to the hospital for abdominal pain evaluation. He reported experiencing building up pain that started on Saturday he described the pain as sharp like a knife is mostly on the right upper quadrant with radiation to the epigastric and back area can wrap around his chest sometimes. His pain improved with bowel rest and sitting and did get exacerbated with movement an eating. He reported associated nausea without significant change in his bowel habit. No vomiting. He denies any fever or chills. In the emergency room, he was hemodynamically stable. His blood pressure was elevated which he attributed to his pain. Labs were unremarkable. Abdominal imaging including CT scan of the abdominal pelvis with contrast showed some changes consistent or concerning for gallbladder disease versus enterocolitis.   This was followed by HIDA scan and ultrasound which all came back negative for acute cholecystitis. GI consult was requested for further evaluation&recommendations. During my encounter, patient was sitting in bed in mild distress. He was given his last pain medication around 2 AM.  He told me that he is afraid to eat as this might bring his pain to more than 10 out of 10. He reports taking nonsteroidal on as-needed basis specifically ibuprofen. And he also reported history of retching and coughing. He had no prior endoscopic work up. He reported Gi discomfort with diary product over the last year. He drinks filtered Well water. No similar symptoms in the house members. Consults    Review of Systems   Constitutional: Negative. Historical Information   Past Medical History:   Diagnosis Date    PTSD (post-traumatic stress disorder)      History reviewed. No pertinent surgical history. Social History   Social History     Substance and Sexual Activity   Alcohol Use Not Currently     Social History     Substance and Sexual Activity   Drug Use Never     E-Cigarette/Vaping    E-Cigarette Use Never User      E-Cigarette/Vaping Substances     Social History     Tobacco Use   Smoking Status Every Day    Packs/day: 1.50    Types: Cigarettes   Smokeless Tobacco Never     Family History: non-contributory    Meds/Allergies   all current active meds have been reviewed    No Known Allergies    Objective       Intake/Output Summary (Last 24 hours) at 10/13/2023 0811  Last data filed at 10/12/2023 2253  Gross per 24 hour   Intake 3952.92 ml   Output --   Net 3952.92 ml       Invasive Devices:   Peripheral IV 10/10/23 Dorsal (posterior); Left Forearm (Active)   Site Assessment WDL 10/12/23 2023   Dressing Type Transparent 10/12/23 2023   Line Status Infusing 10/12/23 2023   Dressing Status Clean;Dry; Intact 10/12/23 2023   Dressing Change Due 10/14/23 10/12/23 0920   Reason Not Rotated Not due 10/12/23 0920       Physical Exam  Vitals and nursing note reviewed. Constitutional:       General: He is not in acute distress. Appearance: He is well-developed. HENT:      Head: Normocephalic and atraumatic. Eyes:      Conjunctiva/sclera: Conjunctivae normal.   Cardiovascular:      Rate and Rhythm: Normal rate and regular rhythm. Heart sounds: No murmur heard. Pulmonary:      Effort: Pulmonary effort is normal. No respiratory distress. Breath sounds: Normal breath sounds. Abdominal:      Palpations: Abdomen is soft. Tenderness: There is abdominal tenderness. There is no right CVA tenderness, left CVA tenderness or guarding. Comments: Pain is reproducible and more on the right ribs   Musculoskeletal:         General: No swelling. Cervical back: Neck supple. Skin:     General: Skin is warm and dry. Capillary Refill: Capillary refill takes less than 2 seconds. Neurological:      Mental Status: He is alert. Psychiatric:         Mood and Affect: Mood normal.         Lab Results: I have personally reviewed pertinent reports. Imaging Studies: I have personally reviewed pertinent films in PACS        Counseling / Coordination of Care  Total floor / unit time spent today 40 minutes. Greater than 50% of total time was spent with the patient and / or family counseling and / or coordination of care.  A description of the counseling / coordination of care:
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
## 2023-10-13 NOTE — DISCHARGE INSTR - AVS FIRST PAGE
Bedrest for next 72 hrs    Return to work Tuesday October 17th. Moist Heat to Tender abdominal wall (Hot Water bottle wrapped in Towel, or Heating Pad wrapped in a towel) 20 minutes several times a day. No Heavy lifting, or strenuous exercise for next 2 weeks    No Ibuprofen, use Celebrex as needed for pain    Take Dexilant or protonix twice a day    Take Carafate 4 x a day    Avoid Caffeine, Avoid tobacco.    Diet as tolerated.

## 2023-10-13 NOTE — PROGRESS NOTES
Progress Note - General Surgery   Lisseth Wright 47 y.o. male MRN: [id number]  Unit/Bed#: -01 Encounter: [id number]    Assessment:  47 y.o. male w/ RUQ abdominal pain aggravated by eating  - HIDA: Normal hepatobiliary study consistent with patency of the cystic duct and without scintigraphic evidence of acute cholecystitis. - US with findings equivocal for acute cholecystitis - Nondistended gallbladder containing a small amount of sludge with minimal pericholecystic fluid, but no wall thickening.      - CT pertinent findings - No calcified gallstones. No pericholecystic inflammatory change. Mild infiltration of the peritoneal fat in the right upper quadrant adjacent to the inferior aspect of the liver/gallbladder fundus without calcified cholelithiasis or evidence of acute cholecystitis, possibly reactive to the bowel process, although slightly remote from it. - Afebrile, VSS on room air - HTN - 158/91  - WBC 10.06 (10.21, 12.40), Hgb 16 (15,17)  - LFTs WNL, Tbili 1.01 performed 10/11     - PMHx: HTN, ptsd, HAs  - PSHx: Umbilical hernia repair x 2, LIH repair, Left rotator cuff repair      Plan:  - Patient continues with intermittent severe RUQ abdominal pain, GI was consulted and will be performing EGD today to r/o PUD  - If no significant findings on EGD will plan to move forward with cholecystectomy which has been discussed with patient and wife by Dr. Kaley Gore  - IVF hydration   - Plan for IV abx on call to OR if surgical intervention is needed  - Pain/nausea control PRN  - Serial exams  - SCDs    Subjective:   Patient states he continues to experience pain following eating and intermittently. He has had some nausea without vomiting. Patient has been NPO since midnight in anticipation of EGD. No fevers, shortness of breath, or chest tightness. Objective:  Blood pressure 158/91, pulse 59, temperature 97.9 °F (36.6 °C), resp.  rate 16, height 6' 1" (1.854 m), weight 106 kg (234 lb 5.6 oz), SpO2 94 %. ,Body mass index is 30.92 kg/m². Intake/Output Summary (Last 24 hours) at 10/13/2023 1149  Last data filed at 10/12/2023 2253  Gross per 24 hour   Intake 3952.92 ml   Output --   Net 3952.92 ml     Invasive Devices       Peripheral Intravenous Line  Duration             Peripheral IV 10/10/23 Dorsal (posterior); Left Forearm 2 days                  Physical Exam:   General: no acute distress, pt appears well and comfortable  Skin: warm and dry to touch  Pulmonary: normal effort  Abdominal: soft, non-distended, remains tender to palpation in RUQ without guarding or rebound noted at this time   Neuro: alert and oriented     Lab, Imaging and other studies:I have personally reviewed pertinent lab results.   , CBC: No results found for: "WBC", "HGB", "HCT", "MCV", "PLT", "ADJUSTEDWBC", "RBC", "MCH", "MCHC", "RDW", "MPV", "NRBC", CMP: No results found for: "SODIUM", "K", "CL", "CO2", "ANIONGAP", "BUN", "CREATININE", "GLUCOSE", "CALCIUM", "AST", "ALT", "ALKPHOS", "PROT", "BILITOT", "EGFR"  VTE Pharmacologic Prophylaxis: Hold for possible OR  VTE Mechanical Prophylaxis: sequential compression device    Brunilda Mcrae PA-C  10/13/2023

## 2023-10-13 NOTE — ANESTHESIA POSTPROCEDURE EVALUATION
Post-Op Assessment Note    CV Status:  Stable  Pain Score: 0       Mental Status:  Agitated   Hydration Status:  Stable   PONV Controlled:  None   Airway Patency:  Patent       Staff: Anesthesiologist       Patient experienced severe emergence delirium and agitation in the PACU after emerging from anesthesia. He is a combat  with a history of PTSD. Frequent reassurance and redirection provided by wife and nurse at bedside. Security and multiple staff members at bedside for approximately an hour as he recovered from his anesthetic.

## 2023-10-14 VITALS
TEMPERATURE: 98.2 F | OXYGEN SATURATION: 94 % | HEIGHT: 73 IN | SYSTOLIC BLOOD PRESSURE: 156 MMHG | RESPIRATION RATE: 18 BRPM | BODY MASS INDEX: 29.69 KG/M2 | WEIGHT: 224 LBS | DIASTOLIC BLOOD PRESSURE: 86 MMHG | HEART RATE: 61 BPM

## 2023-10-14 LAB
ALBUMIN SERPL BCP-MCNC: 4.3 G/DL (ref 3.5–5)
ALP SERPL-CCNC: 55 U/L (ref 34–104)
ALT SERPL W P-5'-P-CCNC: 57 U/L (ref 7–52)
ANION GAP SERPL CALCULATED.3IONS-SCNC: 8 MMOL/L
AST SERPL W P-5'-P-CCNC: 36 U/L (ref 13–39)
BASOPHILS # BLD AUTO: 0.04 THOUSANDS/ÂΜL (ref 0–0.1)
BASOPHILS NFR BLD AUTO: 1 % (ref 0–1)
BILIRUB SERPL-MCNC: 0.54 MG/DL (ref 0.2–1)
BUN SERPL-MCNC: 16 MG/DL (ref 5–25)
CALCIUM SERPL-MCNC: 9.4 MG/DL (ref 8.4–10.2)
CHLORIDE SERPL-SCNC: 105 MMOL/L (ref 96–108)
CO2 SERPL-SCNC: 24 MMOL/L (ref 21–32)
CREAT SERPL-MCNC: 0.95 MG/DL (ref 0.6–1.3)
EOSINOPHIL # BLD AUTO: 0.18 THOUSAND/ÂΜL (ref 0–0.61)
EOSINOPHIL NFR BLD AUTO: 2 % (ref 0–6)
ERYTHROCYTE [DISTWIDTH] IN BLOOD BY AUTOMATED COUNT: 12.4 % (ref 11.6–15.1)
GFR SERPL CREATININE-BSD FRML MDRD: 91 ML/MIN/1.73SQ M
GLUCOSE SERPL-MCNC: 96 MG/DL (ref 65–140)
HCT VFR BLD AUTO: 44 % (ref 36.5–49.3)
HGB BLD-MCNC: 15.4 G/DL (ref 12–17)
IMM GRANULOCYTES # BLD AUTO: 0.04 THOUSAND/UL (ref 0–0.2)
IMM GRANULOCYTES NFR BLD AUTO: 1 % (ref 0–2)
LYMPHOCYTES # BLD AUTO: 0.97 THOUSANDS/ÂΜL (ref 0.6–4.47)
LYMPHOCYTES NFR BLD AUTO: 13 % (ref 14–44)
MCH RBC QN AUTO: 29.8 PG (ref 26.8–34.3)
MCHC RBC AUTO-ENTMCNC: 35 G/DL (ref 31.4–37.4)
MCV RBC AUTO: 85 FL (ref 82–98)
MONOCYTES # BLD AUTO: 0.58 THOUSAND/ÂΜL (ref 0.17–1.22)
MONOCYTES NFR BLD AUTO: 8 % (ref 4–12)
NEUTROPHILS # BLD AUTO: 5.73 THOUSANDS/ÂΜL (ref 1.85–7.62)
NEUTS SEG NFR BLD AUTO: 75 % (ref 43–75)
NRBC BLD AUTO-RTO: 0 /100 WBCS
PLATELET # BLD AUTO: 258 THOUSANDS/UL (ref 149–390)
PMV BLD AUTO: 10.2 FL (ref 8.9–12.7)
POTASSIUM SERPL-SCNC: 3.9 MMOL/L (ref 3.5–5.3)
PROT SERPL-MCNC: 6.6 G/DL (ref 6.4–8.4)
RBC # BLD AUTO: 5.16 MILLION/UL (ref 3.88–5.62)
SODIUM SERPL-SCNC: 137 MMOL/L (ref 135–147)
WBC # BLD AUTO: 7.54 THOUSAND/UL (ref 4.31–10.16)

## 2023-10-14 PROCEDURE — 85025 COMPLETE CBC W/AUTO DIFF WBC: CPT | Performed by: PHYSICIAN ASSISTANT

## 2023-10-14 PROCEDURE — 80053 COMPREHEN METABOLIC PANEL: CPT | Performed by: PHYSICIAN ASSISTANT

## 2023-10-14 RX ADMIN — PANTOPRAZOLE SODIUM 40 MG: 40 TABLET, DELAYED RELEASE ORAL at 05:54

## 2023-10-14 RX ADMIN — CELECOXIB 100 MG: 100 CAPSULE ORAL at 08:51

## 2023-10-14 RX ADMIN — SUCRALFATE 1 G: 1 TABLET ORAL at 11:50

## 2023-10-14 RX ADMIN — SUCRALFATE 1 G: 1 TABLET ORAL at 05:54

## 2023-10-14 RX ADMIN — ACETAMINOPHEN 650 MG: 650 SUSPENSION ORAL at 09:18

## 2023-10-14 RX ADMIN — HYDROCHLOROTHIAZIDE 50 MG: 25 TABLET ORAL at 08:51

## 2023-10-14 RX ADMIN — NICOTINE 1 PATCH: 21 PATCH, EXTENDED RELEASE TRANSDERMAL at 08:51

## 2023-10-14 NOTE — PLAN OF CARE
Problem: PAIN - ADULT  Goal: Verbalizes/displays adequate comfort level or baseline comfort level  Description: Interventions:  - Encourage patient to monitor pain and request assistance  - Assess pain using appropriate pain scale  - Administer analgesics based on type and severity of pain and evaluate response  - Implement non-pharmacological measures as appropriate and evaluate response  - Consider cultural and social influences on pain and pain management  - Notify physician/advanced practitioner if interventions unsuccessful or patient reports new pain  Outcome: Progressing     Problem: SAFETY ADULT  Goal: Patient will remain free of falls  Description: INTERVENTIONS:  - Educate patient/family on patient safety including physical limitations  - Instruct patient to call for assistance with activity   - Consult OT/PT to assist with strengthening/mobility   - Keep Call bell within reach  - Keep bed low and locked with side rails adjusted as appropriate  - Keep care items and personal belongings within reach  - Initiate and maintain comfort rounds  - Make Fall Risk Sign visible to staff

## 2023-10-14 NOTE — PROGRESS NOTES
Progress Note - General Surgery   Manjeet Perry 47 y.o. male MRN: [id number]  Unit/Bed#: -01 Encounter: [id number]    Assessment:  48 yo M with RUQ pain, likely 2/2 gastritis  HIDA negative    EGD 10/13 with erythematous mucosa in stomach consistent with gastritis    Plan:  - stable for d/c today with PPI, carafate  - f/u with GI as outpt  - f/u with general surgery in 3-4 weeks to reevaluate sx and discuss need for cholecystectomy electively  - gastritis diet, details provided    Subjective/Objective     Subjective: no acute complaints or events overnight. Myles Artis for d/c    Objective:     Blood pressure 156/86, pulse 61, temperature 98.2 °F (36.8 °C), resp. rate 18, height 6' 1" (1.854 m), weight 102 kg (224 lb), SpO2 94 %. ,Body mass index is 29.55 kg/m². Intake/Output Summary (Last 24 hours) at 10/14/2023 1216  Last data filed at 10/13/2023 1502  Gross per 24 hour   Intake 200 ml   Output --   Net 200 ml       Invasive Devices       None                   Physical Exam  Vitals and nursing note reviewed. Constitutional:       General: He is not in acute distress. Appearance: Normal appearance. He is normal weight. He is not ill-appearing, toxic-appearing or diaphoretic. HENT:      Head: Normocephalic and atraumatic. Eyes:      Extraocular Movements: Extraocular movements intact. Cardiovascular:      Rate and Rhythm: Normal rate. Pulmonary:      Effort: Pulmonary effort is normal. No respiratory distress. Abdominal:      General: There is no distension. Palpations: Abdomen is soft. Tenderness: There is no abdominal tenderness. There is no guarding or rebound. Musculoskeletal:         General: No swelling or tenderness. Skin:     General: Skin is warm. Capillary Refill: Capillary refill takes less than 2 seconds. Neurological:      General: No focal deficit present. Mental Status: He is alert and oriented to person, place, and time.    Psychiatric:         Mood and Affect: Mood normal.         Behavior: Behavior normal.            Scheduled Meds:  Current Facility-Administered Medications   Medication Dose Route Frequency Provider Last Rate    acetaminophen  650 mg Oral Q4H PRN Juliann Solano PA-C      celecoxib  100 mg Oral BID Bonita Mercer MD      hydrochlorothiazide  50 mg Oral Daily Juliann Solano PA-C      ketorolac  15 mg Intravenous Q6H PRN Chetan Anton MD      morphine injection  4 mg Intravenous Q4H PRN Bonita Mercer MD      nicotine  1 patch Transdermal Daily Bonita Mercer MD      ondansetron  4 mg Intravenous Q6H PRN Bonita Mercer MD      pantoprazole  40 mg Oral BID AC Bonita Mercer MD      sucralfate  1 g Oral 4x Daily [facility] & ) Bonita Mercer MD       Continuous Infusions:   PRN Meds:.  acetaminophen    ketorolac    morphine injection    ondansetron      Lab, Imaging and other studies:I have personally reviewed pertinent lab results.   , CBC:   Lab Results   Component Value Date    WBC 7.54 10/14/2023    HGB 15.4 10/14/2023    HCT 44.0 10/14/2023    MCV 85 10/14/2023     10/14/2023    RBC 5.16 10/14/2023    MCH 29.8 10/14/2023    MCHC 35.0 10/14/2023    RDW 12.4 10/14/2023    MPV 10.2 10/14/2023    NRBC 0 10/14/2023   , CMP:   Lab Results   Component Value Date    SODIUM 137 10/14/2023    K 3.9 10/14/2023     10/14/2023    CO2 24 10/14/2023    BUN 16 10/14/2023    CREATININE 0.95 10/14/2023    CALCIUM 9.4 10/14/2023    AST 36 10/14/2023    ALT 57 (H) 10/14/2023    ALKPHOS 55 10/14/2023    EGFR 91 10/14/2023   , Coagulation: No results found for: "PT", "INR", "APTT", Urinalysis: No results found for: "COLORU", "CLARITYU", "SPECGRAV", "PHUR", "LEUKOCYTESUR", "NITRITE", "PROTEINUA", "GLUCOSEU", "Aldair Lolling", "BILIRUBINUR", "BLOODU", Lipase: No results found for: "LIPASE"    VTE Mechanical Prophylaxis: sequential compression device      Francesca Godwin PA-C  10/14/2023 12:16 PM

## 2023-10-14 NOTE — PLAN OF CARE
Problem: GASTROINTESTINAL - ADULT  Goal: Minimal or absence of nausea and/or vomiting  Description: INTERVENTIONS:  - Administer IV fluids if ordered to ensure adequate hydration  - Maintain NPO status until nausea and vomiting are resolved  - Nasogastric tube if ordered  - Administer ordered antiemetic medications as needed  - Provide nonpharmacologic comfort measures as appropriate  - Advance diet as tolerated, if ordered  - Consider nutrition services referral to assist patient with adequate nutrition and appropriate food choices  Outcome: Adequate for Discharge     Problem: PAIN - ADULT  Goal: Verbalizes/displays adequate comfort level or baseline comfort level  Description: Interventions:  - Encourage patient to monitor pain and request assistance  - Assess pain using appropriate pain scale  - Administer analgesics based on type and severity of pain and evaluate response  - Implement non-pharmacological measures as appropriate and evaluate response  - Consider cultural and social influences on pain and pain management  - Notify physician/advanced practitioner if interventions unsuccessful or patient reports new pain  Outcome: Adequate for Discharge     Problem: SAFETY ADULT  Goal: Patient will remain free of falls  Description: INTERVENTIONS:  - Educate patient/family on patient safety including physical limitations  - Instruct patient to call for assistance with activity   - Consult OT/PT to assist with strengthening/mobility   - Keep Call bell within reach  - Keep bed low and locked with side rails adjusted as appropriate  - Keep care items and personal belongings within reach  - Initiate and maintain comfort rounds  - Make Fall Risk Sign visible to staff  - Offer Toileting every    Hours, in advance of need  - Initiate/Maintain   alarm  - Obtain necessary fall risk management equipment:     - Apply yellow socks and bracelet for high fall risk patients  - Consider moving patient to room near nurses station  Outcome: Adequate for Discharge  Goal: Maintain or return to baseline ADL function  Description: INTERVENTIONS:  -  Assess patient's ability to carry out ADLs; assess patient's baseline for ADL function and identify physical deficits which impact ability to perform ADLs (bathing, care of mouth/teeth, toileting, grooming, dressing, etc.)  - Assess/evaluate cause of self-care deficits   - Assess range of motion  - Assess patient's mobility; develop plan if impaired  - Assess patient's need for assistive devices and provide as appropriate  - Encourage maximum independence but intervene and supervise when necessary  - Involve family in performance of ADLs  - Assess for home care needs following discharge   - Consider OT consult to assist with ADL evaluation and planning for discharge  - Provide patient education as appropriate  Outcome: Adequate for Discharge  Goal: Maintains/Returns to pre admission functional level  Description: INTERVENTIONS:  - Perform BMAT or MOVE assessment daily.   - Set and communicate daily mobility goal to care team and patient/family/caregiver. - Collaborate with rehabilitation services on mobility goals if consulted  - Perform Range of Motion    times a day. - Reposition patient every    hours.   - Dangle patient    times a day  - Stand patient    times a day  - Ambulate patient    times a day  - Out of bed to chair    times a day   - Out of bed for meals          times a day  - Out of bed for toileting  - Record patient progress and toleration of activity level   Outcome: Adequate for Discharge     Problem: DISCHARGE PLANNING  Goal: Discharge to home or other facility with appropriate resources  Description: INTERVENTIONS:  - Identify barriers to discharge w/patient and caregiver  - Arrange for needed discharge resources and transportation as appropriate  - Identify discharge learning needs (meds, wound care, etc.)  - Arrange for interpretive services to assist at discharge as needed  - Refer to Case Management Department for coordinating discharge planning if the patient needs post-hospital services based on physician/advanced practitioner order or complex needs related to functional status, cognitive ability, or social support system  Outcome: Adequate for Discharge     Problem: Knowledge Deficit  Goal: Patient/family/caregiver demonstrates understanding of disease process, treatment plan, medications, and discharge instructions  Description: Complete learning assessment and assess knowledge base.   Interventions:  - Provide teaching at level of understanding  - Provide teaching via preferred learning methods  Outcome: Adequate for Discharge

## 2023-10-16 NOTE — UTILIZATION REVIEW
NOTIFICATION OF ADMISSION DISCHARGE   This is a Notification of Discharge from [address] [facility]. Please be advised that this patient has been discharge from our facility. Below you will find the admission and discharge date and time including the patient’s disposition. UTILIZATION REVIEW CONTACT:  Nette Rainey  Utilization   Network Utilization Review Department  Phone: [phone] x carefully listen to the prompts. All voicemails are confidential.  Email: [email or web address]     ADMISSION INFORMATION  PRESENTATION DATE: 10/10/2023  6:13 AM  OBERVATION ADMISSION DATE:   INPATIENT ADMISSION DATE: 10/12/23  3:23 PM   DISCHARGE DATE: 10/14/2023  1:27 PM   DISPOSITION:Home/Self Care    Network Utilization Review Department  ATTENTION: Please call with any questions or concerns to [phone] and carefully listen to the prompts so that you are directed to the right person. All voicemails are confidential.   For Discharge needs, contact Care Management DC Support Team at [phone] opt. 2  Send all requests for admission clinical reviews, approved or denied determinations and any other requests to dedicated fax number below belonging to the campus where the patient is receiving treatment.  List of dedicated fax numbers for the Facilities:  [place] DENIALS (Administrative/Medical Necessity) [phone]   DISCHARGE SUPPORT TEAM (Network) [phone] [address] [facility] (Maternity/NICU/Pediatrics) [phone]   [address] [facility] [address] [place] [place] [address] [address]  [phone]

## 2023-10-16 NOTE — UTILIZATION REVIEW
NOTIFICATION OF INPATIENT ADMISSION   AUTHORIZATION REQUEST   SERVICING FACILITY:   [address]  Tax ID: [id number]  NPI: [id number] ATTENDING PROVIDER:  Attending Name and NPI#: David Brito [[id number]]  Address: [address] [place] [address]  Phone: [phone]   ADMISSION INFORMATION:  Place of Service: [address] [place] [address] Code: 21  Inpatient Admission Date/Time: 10/12/23  3:23 PM  Discharge Date/Time: 10/14/2023  1:27 PM  Admitting Diagnosis Code/Description:  Abdominal pain [R10.9]  RUQ abdominal pain [R10.11]     UTILIZATION REVIEW CONTACT:  Kenneth Hutton Utilization   Network Utilization Review Department  Phone: [phone]  Fax [phone]  Email: [email or web address]  Contact for approvals/pending authorizations, clinical reviews, and discharge. PHYSICIAN ADVISORY SERVICES:  Medical Necessity Denial & [place] Review  Phone: [phone]  Fax: [phone]  Email: [email or web address]     DISCHARGE SUPPORT TEAM:  For Patients Discharge Needs & Updates  Phone: [phone] opt. 2 Fax: [phone]  Email: [email or web address]

## 2023-10-16 NOTE — DISCHARGE SUMMARY
Discharge Summary - Khalif Garcia 47 y.o. male MRN: [id number]    Unit/Bed#: -01 Encounter: [id number]    Admission Date: 10/10/2023   Discharge Date: 10/14/2023    Admitting Diagnosis:   Abdominal pain [R10.9]  RUQ abdominal pain [R10.11]    Discharge Diagnoses: Principal Problem:    RUQ abdominal pain  Active Problems:    Primary hypertension    PTSD (post-traumatic stress disorder)    History of emergence delirium    Acute superficial gastritis without hemorrhage      Consultations: GI    Imaging: 10/10 CTAP: IMPRESSION:  1. Findings suggesting mild enterocolitis in the appropriate clinical setting. 2. Mild infiltration of the peritoneal fat in the right upper quadrant adjacent to the inferior aspect of the liver/gallbladder fundus without calcified cholelithiasis or evidence of acute cholecystitis, possibly reactive to the bowel process, although   slightly remote from it. 3. Hepatic steatosis. 10/10 RUQ u/s: IMPRESSION:  Nondistended gallbladder containing a small amount of sludge with minimal pericholecystic fluid, but no wall thickening. Tenderness throughout the region was elicited, but not specific to the gallbladder. Findings are equivocal for acute cholecystitis. If there is continued clinical concern, HIDA scan can be obtained. Hepatic steatosis. 10/11 HIDA: IMPRESSION:  Normal hepatobiliary study consistent with patency of the cystic duct and without scintigraphic evidence of acute cholecystitis. 10/12 RUQ u/s:    IMPRESSION:  1. Small amount of gallbladder sludge. No findings of acute cholecystitis. No biliary ductal dilation. 2. Hepatomegaly and marked hepatic steatosis. 10/13 ERCP: Small hiatal hernia  Abnormal mucosa; performed cold forceps biopsies  The 2nd part of the duodenum appeared normal.    Procedures Performed: 10/13 ERCP (GI)    Laury Johnson is a 47 y.o. male with PMHx of HTN who presents with severe worsening RUQ abdominal pain x 2 days.  He has had associated symptoms of nausea, fever, chills, body aches and mild constipation. His pain is a constant ache. It is aggrevated by lying on his side or palpating in the RUQ of his abdomen. He denies diarrhea, hematochezia, bloating, vomiting, cp or sob. While in the ED, US was equivical for cholecystitis with minimal pericholecystic fluid and no stones or wall thickening. CT showed signs of possible mild enterocolitis. Lab studies showed mild leukocytosis, otherwise normal.    (HPI by Baljit Kaplan PA-C 10/10/23)    Hospital Course: Chantal Rocha is a 47 y.o. male who presented 10/10/2023 with abdominal pain. He was admitted to the surgical service for workup and to r/o acute cholecystitis. HIDA scan was ordered which was negative for acute cholecystitis. GI was consulted and performed ERCP that revealed findings consistent with gastritis. They recommended PPI and carafate on d/c. By HD#5 pt was tolerating diet without n/v. His pain was well controlled on the GI regimen. He was without leukocytosis. He was deemed stable for d/c. Pt was given instructions to follow up with GI and was also told to f/u with general surgery service in approx 3-4 weeks to see if his symptoms were improved and to discuss elective cholecystectomy. He was instructed to return to ED if other sx including but not limited to worsening pain, nausea/vomiting, fevers. Condition at Discharge: good     Discharge instructions/Information to patient and family:   See after visit summary for information provided to patient and family. Provisions for Follow-Up Care:  See after visit summary for information related to follow-up care and any pertinent home health orders. Disposition: Home    Planned Readmission: No    Discharge Statement   I spent 30 minutes discharging the patient. This time was spent on the day of discharge. I had direct contact with the patient on the day of discharge.  Additional documentation is required if more than 30 minutes were spent on discharge. Discharge Medications:  See after visit summary for reconciled discharge medications provided to patient and family.

## 2023-10-17 PROCEDURE — 88305 TISSUE EXAM BY PATHOLOGIST: CPT | Performed by: SPECIALIST
